# Patient Record
Sex: FEMALE | URBAN - METROPOLITAN AREA
[De-identification: names, ages, dates, MRNs, and addresses within clinical notes are randomized per-mention and may not be internally consistent; named-entity substitution may affect disease eponyms.]

---

## 2024-02-22 ENCOUNTER — TELEPHONE (OUTPATIENT)
Dept: ORTHOPEDIC SURGERY | Age: 74
End: 2024-02-22

## 2024-02-22 NOTE — TELEPHONE ENCOUNTER
Pt called to check on referral sent by Dr. Liu. Looked in her chart and did not see a referral or any recent notes from Dr. Liu. Pt told me she had sx in 12/14/23 and Dr. Liu referred her to   after the sx. Told her she would need to fax her records and Dr. Lerma would need to review them before we could schedule. Pt said she will call Dr. Liu's office and fax them over.

## 2024-02-29 ENCOUNTER — TELEPHONE (OUTPATIENT)
Dept: ORTHOPEDIC SURGERY | Age: 74
End: 2024-02-29

## 2024-02-29 NOTE — TELEPHONE ENCOUNTER
Rosanne Pt referred by Dr. Liu for Charcot's foot(Rt) . Pt recently had  an amputation of the second toe on the right foot in December 2023. I have placed her records in your box, please review and advise.

## 2024-07-09 ENCOUNTER — OFFICE VISIT (OUTPATIENT)
Dept: ORTHOPEDIC SURGERY | Age: 74
End: 2024-07-09
Payer: MEDICARE

## 2024-07-09 DIAGNOSIS — E11.610 CHARCOT FOOT DUE TO DIABETES MELLITUS (HCC): ICD-10-CM

## 2024-07-09 DIAGNOSIS — R52 PAIN: Primary | ICD-10-CM

## 2024-07-09 PROCEDURE — 3017F COLORECTAL CA SCREEN DOC REV: CPT | Performed by: ORTHOPAEDIC SURGERY

## 2024-07-09 PROCEDURE — G8421 BMI NOT CALCULATED: HCPCS | Performed by: ORTHOPAEDIC SURGERY

## 2024-07-09 PROCEDURE — 3046F HEMOGLOBIN A1C LEVEL >9.0%: CPT | Performed by: ORTHOPAEDIC SURGERY

## 2024-07-09 PROCEDURE — G8428 CUR MEDS NOT DOCUMENT: HCPCS | Performed by: ORTHOPAEDIC SURGERY

## 2024-07-09 PROCEDURE — 4004F PT TOBACCO SCREEN RCVD TLK: CPT | Performed by: ORTHOPAEDIC SURGERY

## 2024-07-09 PROCEDURE — 2022F DILAT RTA XM EVC RTNOPTHY: CPT | Performed by: ORTHOPAEDIC SURGERY

## 2024-07-09 PROCEDURE — G8400 PT W/DXA NO RESULTS DOC: HCPCS | Performed by: ORTHOPAEDIC SURGERY

## 2024-07-09 PROCEDURE — 99204 OFFICE O/P NEW MOD 45 MIN: CPT | Performed by: ORTHOPAEDIC SURGERY

## 2024-07-09 PROCEDURE — 1090F PRES/ABSN URINE INCON ASSESS: CPT | Performed by: ORTHOPAEDIC SURGERY

## 2024-07-09 PROCEDURE — 1123F ACP DISCUSS/DSCN MKR DOCD: CPT | Performed by: ORTHOPAEDIC SURGERY

## 2024-07-09 RX ORDER — ESCITALOPRAM OXALATE 10 MG/1
10 TABLET ORAL DAILY
COMMUNITY
Start: 2024-05-08

## 2024-07-09 RX ORDER — METOPROLOL SUCCINATE 200 MG/1
1 TABLET, EXTENDED RELEASE ORAL
COMMUNITY

## 2024-07-09 RX ORDER — POTASSIUM CHLORIDE 750 MG/1
10 TABLET, FILM COATED, EXTENDED RELEASE ORAL
COMMUNITY

## 2024-07-09 RX ORDER — METOPROLOL SUCCINATE 200 MG/1
200 TABLET, EXTENDED RELEASE ORAL DAILY
COMMUNITY

## 2024-07-09 RX ORDER — POTASSIUM CHLORIDE 1500 MG/1
TABLET, EXTENDED RELEASE ORAL
COMMUNITY

## 2024-07-09 RX ORDER — CELECOXIB 100 MG/1
CAPSULE ORAL
COMMUNITY
Start: 2024-06-19

## 2024-07-09 RX ORDER — METOPROLOL SUCCINATE 100 MG/1
100 TABLET, EXTENDED RELEASE ORAL DAILY
COMMUNITY

## 2024-07-09 RX ORDER — APIXABAN 5 MG/1
5 TABLET, FILM COATED ORAL 2 TIMES DAILY
COMMUNITY

## 2024-07-09 RX ORDER — SACUBITRIL AND VALSARTAN 24; 26 MG/1; MG/1
1 TABLET, FILM COATED ORAL 2 TIMES DAILY
COMMUNITY

## 2024-07-09 RX ORDER — METHIMAZOLE 10 MG/1
10 TABLET ORAL DAILY
COMMUNITY

## 2024-07-09 RX ORDER — AMOXICILLIN AND CLAVULANATE POTASSIUM 875; 125 MG/1; MG/1
TABLET, FILM COATED ORAL
COMMUNITY
Start: 2024-04-22

## 2024-07-09 NOTE — PROGRESS NOTES
damage from local anesthetic, damage to the airway or mouth structures, respiratory distress, cardiac disease exacerbation, potential arrhythmias, and even death.  The patient verbalized understanding of each of these risk  The patient was thoroughly informed regarding the Treatment Plan.  Through shared decision making the patient elected to proceed with surgery and consented to the procedure.         No past medical history on file.  No past surgical history on file.  Allergies   Allergen Reactions    Atorvastatin Myalgia and Other (See Comments)    Escitalopram Oxalate Diarrhea and Other (See Comments)         Current Outpatient Medications:     amoxicillin-clavulanate (AUGMENTIN) 875-125 MG per tablet, TAKE 1 TABLET BY MOUTH TWICE DAILY FOR 15 DAYS, Disp: , Rfl:     celecoxib (CELEBREX) 100 MG capsule, TAKE 1 CAPSULE BY MOUTH ONCE DAILY WITH FOOD, Disp: , Rfl:     escitalopram (LEXAPRO) 10 MG tablet, Take 1 tablet by mouth daily, Disp: , Rfl:     mupirocin (BACTROBAN) 2 % ointment, Apply topically daily, Disp: , Rfl:     ELIQUIS 5 MG TABS tablet, Take 1 tablet by mouth 2 times daily, Disp: , Rfl:     metFORMIN (GLUCOPHAGE) 500 MG tablet, Take 1 tablet by mouth nightly, Disp: , Rfl:     methIMAzole (TAPAZOLE) 10 MG tablet, Take 1 tablet by mouth daily, Disp: , Rfl:     metoprolol succinate (TOPROL XL) 100 MG extended release tablet, Take 1 tablet by mouth daily, Disp: , Rfl:     metoprolol succinate (TOPROL XL) 200 MG extended release tablet, Take 1 tablet by mouth daily, Disp: , Rfl:     metoprolol succinate (TOPROL XL) 200 MG extended release tablet, Take 1 tablet by mouth Every Day, Disp: , Rfl:     potassium chloride (K-TAB) 20 MEQ TBCR extended release tablet, TAKE 1 TABLET BY MOUTH ONCE DAILY FOR 30 DAYS WITH FOOD, Disp: , Rfl:     ENTRESTO 24-26 MG per tablet, Take 1 tablet by mouth 2 times daily, Disp: , Rfl:     potassium chloride (KLOR-CON) 10 MEQ extended release tablet, Take 1 tablet by mouth, Disp: ,

## 2024-07-10 ENCOUNTER — TELEPHONE (OUTPATIENT)
Dept: ORTHOPEDIC SURGERY | Age: 74
End: 2024-07-10

## 2024-07-10 DIAGNOSIS — L08.9 RIGHT FOOT INFECTION: ICD-10-CM

## 2024-07-22 ENCOUNTER — TELEPHONE (OUTPATIENT)
Dept: ORTHOPEDIC SURGERY | Age: 74
End: 2024-07-22

## 2024-08-05 ENCOUNTER — TELEPHONE (OUTPATIENT)
Dept: ORTHOPEDIC SURGERY | Age: 74
End: 2024-08-05

## 2024-08-12 ENCOUNTER — TELEPHONE (OUTPATIENT)
Dept: ORTHOPEDIC SURGERY | Age: 74
End: 2024-08-12

## 2024-08-27 ENCOUNTER — OFFICE VISIT (OUTPATIENT)
Dept: ORTHOPEDIC SURGERY | Age: 74
End: 2024-08-27

## 2024-08-27 DIAGNOSIS — E11.610 CHARCOT FOOT DUE TO DIABETES MELLITUS (HCC): Primary | ICD-10-CM

## 2024-08-27 NOTE — PROGRESS NOTES
Name: Doris Sol  YOB: 1950  Gender: female  MRN: [id number]    Summary: Charcot foot with plantar ulceration (prior 1st ray and 2nd,3rd toe amputations)  A1c: 9.0 (April 2024)  On Eliquis for atrial fibrillation    Patient to proceed with surgery. Consent to read: right transmetatarsal amputation with wound debridement and drain     Popliteal saphenous block-postoperative meds     CC: right charcot foot    HPI: Doris Sol is a 73 y.o. female who presents due to a wound on their foot.  Patient has been dealing with this medial plantar ulceration for approximately 2 years.  She has been seeing Dr. Liu with wound care.  She has a history of several years ago of a great toe with first ray amputation.  She underwent second and third toe amputations with Dr. Liu in December 2023. In April 2024, she developed a necrotizing soft tissue infection on the dorsal aspect of her right foot. This was surgically debrided and treated with IV antibiotics. This has left a wound on the dorsal aspect of her right foot.  Patient is currently still in wound care using an offloading shoe.      8/27/24-she is obtained her A1c.  It is 7.4.  Her cardiologist has cleared her off her Eliquis.  She is ready proceed with surgery    ROS/Meds/PSH/PMH/FH/SH:   Patient Denies fever/chills, headache, visual changes, chest pain, shortness of breath, and nausea/vomiting/diarrhea     Tobacco:  has no history on file for tobacco use.  Diabetes: Diabetic - Insulin dependent  No results found for: \"LABA1C\"    Physical Examination:  Gen: AAOx3 NAD    right lower:   Gastroc Contracture noted on silverskoid exam: With the hindfoot in neutral and forefoot supinated ankle dorsiflexion is diminished with knee in extension when compared to the knee at 90deg  Charcot foot deformity with prior 1st - 3rd toe amputations  Today the are hear primary to discuss their wound.   There is a wound: medial plantar wound is 1 cm x 1 cm

## 2024-08-27 NOTE — H&P (VIEW-ONLY)
Name: Doris Sol  YOB: 1950  Gender: female  MRN: [id number]    Summary: Charcot foot with plantar ulceration (prior 1st ray and 2nd,3rd toe amputations)  A1c: 9.0 (April 2024)  On Eliquis for atrial fibrillation    Patient to proceed with surgery. Consent to read: right transmetatarsal amputation with wound debridement and drain     Popliteal saphenous block-postoperative meds     CC: right charcot foot    HPI: Doris Sol is a 73 y.o. female who presents due to a wound on their foot.  Patient has been dealing with this medial plantar ulceration for approximately 2 years.  She has been seeing Dr. Liu with wound care.  She has a history of several years ago of a great toe with first ray amputation.  She underwent second and third toe amputations with Dr. Liu in December 2023. In April 2024, she developed a necrotizing soft tissue infection on the dorsal aspect of her right foot. This was surgically debrided and treated with IV antibiotics. This has left a wound on the dorsal aspect of her right foot.  Patient is currently still in wound care using an offloading shoe.      8/27/24-she is obtained her A1c.  It is 7.4.  Her cardiologist has cleared her off her Eliquis.  She is ready proceed with surgery    ROS/Meds/PSH/PMH/FH/SH:   Patient Denies fever/chills, headache, visual changes, chest pain, shortness of breath, and nausea/vomiting/diarrhea     Tobacco:  has no history on file for tobacco use.  Diabetes: Diabetic - Insulin dependent  No results found for: \"LABA1C\"    Physical Examination:  Gen: AAOx3 NAD    right lower:   Gastroc Contracture noted on silverskoid exam: With the hindfoot in neutral and forefoot supinated ankle dorsiflexion is diminished with knee in extension when compared to the knee at 90deg  Charcot foot deformity with prior 1st - 3rd toe amputations  Today the are hear primary to discuss their wound.   There is a wound: medial plantar wound is 1 cm x 1 cm  tablet by mouth, Disp: , Rfl:

## 2024-08-28 RX ORDER — FUROSEMIDE 40 MG
40 TABLET ORAL 2 TIMES DAILY
COMMUNITY

## 2024-08-28 NOTE — PERIOP NOTE
Patient verified name and .  Order for consent not found in EHR and matches case posting; patient verifies procedure.   Type 1B surgery, phone assessment complete.  Orders not received.  Labs per surgeon: none  Labs per anesthesia protocol: Hgb, POC Glucose DOS, K+  Labs s/h for DOS. Patient is unable to come in for labs PTS 24. Requests labs be drawn morning of procedure and verbalizes understanding abnormal results may result in cancellation per MD's discretion.     Cardiac clearance for surgery and clearance for medication hold  in EHR    Patient answered medical/surgical history questions at their best of ability. All prior to admission medications documented in EPIC.    Patient instructed to continue taking all prescription medications up to the day of surgery but to take only the following medications the day of surgery according to anesthesia guidelines with a small sip of water: Metoprolol succinate (Topol XL), sacubitril-valsartan (Entresto), Methlmazole (Tapazole)  Also, patient is requested to take 2 Tylenol in the morning and then again before bed on the day before surgery. Regular or extra strength may be used.       Patient informed that all vitamins and supplements should be held 7 days prior to surgery and NSAIDS 5 days prior to surgery. Prescription meds to hold:Furosemide (Lasix), Celecoxib (Celebrex)   Patient holding Apixaban (Eliquis) as instructed by Cardiology/Surgeon (2 days PTS)    Patient instructed on the following:    > Arrive at [facility]  Entrance, time of arrival to be called the day before by 1700  > NPO after midnight, unless otherwise indicated, including gum, mints, and ice chips  > Responsible adult must drive patient to the hospital, stay during surgery, and patient will need supervision 24 hours after anesthesia  > Use non moisturizing soap in shower the night before surgery and on the morning of surgery  > All piercings must be removed prior to arrival.    > Leave all  valuables (money and jewelry) at home but bring insurance card and ID on DOS.   > You may be required to pay a deductible or co-pay on the day of your procedure. You can pre-pay by calling [phone] if your surgery is at the [facility] or [phone] if your surgery is at the [facility].  > Do not wear make-up, nail polish, lotions, cologne, perfumes, powders, or oil on skin. Artificial nails are not permitted.

## 2024-08-30 DIAGNOSIS — G89.18 POST-OP PAIN: Primary | ICD-10-CM

## 2024-08-30 RX ORDER — ASPIRIN 81 MG/1
81 TABLET ORAL 2 TIMES DAILY
Qty: 40 TABLET | Refills: 0 | OUTPATIENT
Start: 2024-08-30 | End: 2024-09-19

## 2024-08-30 RX ORDER — DOCUSATE SODIUM 100 MG/1
100 CAPSULE, LIQUID FILLED ORAL 2 TIMES DAILY
Qty: 60 CAPSULE | Refills: 0 | Status: SHIPPED | OUTPATIENT
Start: 2024-08-30 | End: 2024-09-29

## 2024-08-30 RX ORDER — ONDANSETRON 4 MG/1
4 TABLET, FILM COATED ORAL DAILY PRN
Qty: 30 TABLET | Refills: 0 | Status: SHIPPED | OUTPATIENT
Start: 2024-08-30

## 2024-08-30 RX ORDER — ONDANSETRON 4 MG/1
4 TABLET, FILM COATED ORAL DAILY PRN
Qty: 30 TABLET | Refills: 0 | OUTPATIENT
Start: 2024-08-30

## 2024-08-30 RX ORDER — OXYCODONE HYDROCHLORIDE 5 MG/1
5 TABLET ORAL EVERY 6 HOURS PRN
Qty: 20 TABLET | Refills: 0 | OUTPATIENT
Start: 2024-08-30 | End: 2024-09-04

## 2024-08-30 RX ORDER — OXYCODONE HYDROCHLORIDE 5 MG/1
5 TABLET ORAL EVERY 6 HOURS PRN
Qty: 20 TABLET | Refills: 0 | Status: SHIPPED | OUTPATIENT
Start: 2024-08-30 | End: 2024-09-04

## 2024-08-30 RX ORDER — DOCUSATE SODIUM 100 MG/1
100 CAPSULE, LIQUID FILLED ORAL DAILY PRN
Qty: 30 CAPSULE | Refills: 0 | OUTPATIENT
Start: 2024-08-30

## 2024-09-03 RX ORDER — IPRATROPIUM BROMIDE AND ALBUTEROL SULFATE 2.5; .5 MG/3ML; MG/3ML
1 SOLUTION RESPIRATORY (INHALATION)
Status: CANCELLED | OUTPATIENT
Start: 2024-09-03 | End: 2024-09-04

## 2024-09-03 RX ORDER — HALOPERIDOL 5 MG/ML
1 INJECTION INTRAMUSCULAR
Status: CANCELLED | OUTPATIENT
Start: 2024-09-03 | End: 2024-09-04

## 2024-09-03 RX ORDER — PROCHLORPERAZINE EDISYLATE 5 MG/ML
5 INJECTION INTRAMUSCULAR; INTRAVENOUS
Status: CANCELLED | OUTPATIENT
Start: 2024-09-03 | End: 2024-09-04

## 2024-09-03 RX ORDER — HYDROMORPHONE HYDROCHLORIDE 2 MG/ML
0.5 INJECTION, SOLUTION INTRAMUSCULAR; INTRAVENOUS; SUBCUTANEOUS EVERY 5 MIN PRN
Status: CANCELLED | OUTPATIENT
Start: 2024-09-03

## 2024-09-03 RX ORDER — NALOXONE HYDROCHLORIDE 0.4 MG/ML
INJECTION, SOLUTION INTRAMUSCULAR; INTRAVENOUS; SUBCUTANEOUS PRN
Status: CANCELLED | OUTPATIENT
Start: 2024-09-03

## 2024-09-03 RX ORDER — OXYCODONE HYDROCHLORIDE 5 MG/1
5 TABLET ORAL
Status: CANCELLED | OUTPATIENT
Start: 2024-09-03 | End: 2024-09-04

## 2024-09-03 NOTE — PROGRESS NOTES
Preop department called to notify patient of arrival time for scheduled procedure. Instructions given to   - Arrive at OPC Entrance [address].  - Remain NPO after midnight, unless otherwise indicated, including gum, mints, and ice chips.   - Have a responsible adult to drive patient to the hospital, stay during surgery, and patient will need supervision 24 hours after anesthesia.   - Use antibacterial soap in shower the night before surgery and on the morning of surgery.       Was patient contacted: yes, self  Voicemail left: n/a  Numbers contacted: [phone]   Arrival time: 0630

## 2024-09-04 ENCOUNTER — ANESTHESIA EVENT (OUTPATIENT)
Dept: SURGERY | Age: 74
End: 2024-09-04
Payer: MEDICARE

## 2024-09-04 ENCOUNTER — ANESTHESIA (OUTPATIENT)
Dept: SURGERY | Age: 74
End: 2024-09-04
Payer: MEDICARE

## 2024-09-04 ENCOUNTER — APPOINTMENT (OUTPATIENT)
Dept: GENERAL RADIOLOGY | Age: 74
End: 2024-09-04
Attending: ORTHOPAEDIC SURGERY
Payer: MEDICARE

## 2024-09-04 ENCOUNTER — HOSPITAL ENCOUNTER (OUTPATIENT)
Age: 74
Setting detail: OBSERVATION
LOS: 1 days | Discharge: NURSING, REHAB OR LONG-TERM CARE | End: 2024-09-06
Attending: ORTHOPAEDIC SURGERY | Admitting: ORTHOPAEDIC SURGERY
Payer: MEDICARE

## 2024-09-04 DIAGNOSIS — G89.18 POST-OP PAIN: ICD-10-CM

## 2024-09-04 PROBLEM — E11.628 DIABETIC INFECTION OF RIGHT FOOT (HCC): Status: ACTIVE | Noted: 2024-09-04

## 2024-09-04 PROBLEM — E78.5 HLD (HYPERLIPIDEMIA): Status: ACTIVE | Noted: 2024-09-04

## 2024-09-04 PROBLEM — Z98.890 STATUS POST FOOT SURGERY: Status: ACTIVE | Noted: 2024-09-04

## 2024-09-04 PROBLEM — I48.91 A-FIB (HCC): Status: ACTIVE | Noted: 2024-09-04

## 2024-09-04 PROBLEM — L08.9 DIABETIC INFECTION OF RIGHT FOOT (HCC): Status: ACTIVE | Noted: 2024-09-04

## 2024-09-04 PROBLEM — E11.9 DIABETES MELLITUS TYPE 2 IN NONOBESE (HCC): Status: ACTIVE | Noted: 2024-09-04

## 2024-09-04 PROBLEM — I10 HTN (HYPERTENSION): Status: ACTIVE | Noted: 2024-09-04

## 2024-09-04 LAB
CREAT SERPL-MCNC: 1.01 MG/DL (ref 0.6–1.1)
GLUCOSE BLD STRIP.AUTO-MCNC: 112 MG/DL (ref 65–100)
GLUCOSE BLD STRIP.AUTO-MCNC: 177 MG/DL (ref 65–100)
GLUCOSE BLD STRIP.AUTO-MCNC: 258 MG/DL (ref 65–100)
GLUCOSE BLD STRIP.AUTO-MCNC: 338 MG/DL (ref 65–100)
HGB BLD-MCNC: 10.3 G/DL (ref 11.7–15.4)
PLATELET # BLD AUTO: 231 K/UL (ref 150–450)
POTASSIUM BLD-SCNC: 4.4 MMOL/L (ref 3.5–5.1)
SERVICE CMNT-IMP: ABNORMAL

## 2024-09-04 PROCEDURE — 3600000013 HC SURGERY LEVEL 3 ADDTL 15MIN: Performed by: ORTHOPAEDIC SURGERY

## 2024-09-04 PROCEDURE — 7100000000 HC PACU RECOVERY - FIRST 15 MIN: Performed by: ORTHOPAEDIC SURGERY

## 2024-09-04 PROCEDURE — 7100000001 HC PACU RECOVERY - ADDTL 15 MIN: Performed by: ORTHOPAEDIC SURGERY

## 2024-09-04 PROCEDURE — 3700000001 HC ADD 15 MINUTES (ANESTHESIA): Performed by: ORTHOPAEDIC SURGERY

## 2024-09-04 PROCEDURE — 2580000003 HC RX 258: Performed by: ORTHOPAEDIC SURGERY

## 2024-09-04 PROCEDURE — 2580000003 HC RX 258: Performed by: REGISTERED NURSE

## 2024-09-04 PROCEDURE — 82962 GLUCOSE BLOOD TEST: CPT

## 2024-09-04 PROCEDURE — 97535 SELF CARE MNGMENT TRAINING: CPT

## 2024-09-04 PROCEDURE — 6370000000 HC RX 637 (ALT 250 FOR IP): Performed by: ANESTHESIOLOGY

## 2024-09-04 PROCEDURE — G0378 HOSPITAL OBSERVATION PER HR: HCPCS

## 2024-09-04 PROCEDURE — 97165 OT EVAL LOW COMPLEX 30 MIN: CPT

## 2024-09-04 PROCEDURE — 82565 ASSAY OF CREATININE: CPT

## 2024-09-04 PROCEDURE — 2500000003 HC RX 250 WO HCPCS: Performed by: ORTHOPAEDIC SURGERY

## 2024-09-04 PROCEDURE — 85049 AUTOMATED PLATELET COUNT: CPT

## 2024-09-04 PROCEDURE — 2709999900 HC NON-CHARGEABLE SUPPLY: Performed by: ORTHOPAEDIC SURGERY

## 2024-09-04 PROCEDURE — 6370000000 HC RX 637 (ALT 250 FOR IP): Performed by: ORTHOPAEDIC SURGERY

## 2024-09-04 PROCEDURE — 64447 NJX AA&/STRD FEMORAL NRV IMG: CPT | Performed by: ANESTHESIOLOGY

## 2024-09-04 PROCEDURE — 2500000003 HC RX 250 WO HCPCS: Performed by: REGISTERED NURSE

## 2024-09-04 PROCEDURE — 6370000000 HC RX 637 (ALT 250 FOR IP): Performed by: INTERNAL MEDICINE

## 2024-09-04 PROCEDURE — 11042 DBRDMT SUBQ TIS 1ST 20SQCM/<: CPT | Performed by: ORTHOPAEDIC SURGERY

## 2024-09-04 PROCEDURE — 36415 COLL VENOUS BLD VENIPUNCTURE: CPT

## 2024-09-04 PROCEDURE — 3600000003 HC SURGERY LEVEL 3 BASE: Performed by: ORTHOPAEDIC SURGERY

## 2024-09-04 PROCEDURE — 27685 REVISION OF LOWER LEG TENDON: CPT | Performed by: ORTHOPAEDIC SURGERY

## 2024-09-04 PROCEDURE — 85018 HEMOGLOBIN: CPT

## 2024-09-04 PROCEDURE — 6360000002 HC RX W HCPCS: Performed by: PHYSICIAN ASSISTANT

## 2024-09-04 PROCEDURE — 28800 AMPUTATION OF MIDFOOT: CPT | Performed by: ORTHOPAEDIC SURGERY

## 2024-09-04 PROCEDURE — 3700000000 HC ANESTHESIA ATTENDED CARE: Performed by: ORTHOPAEDIC SURGERY

## 2024-09-04 PROCEDURE — 6360000002 HC RX W HCPCS: Performed by: ANESTHESIOLOGY

## 2024-09-04 PROCEDURE — 6360000002 HC RX W HCPCS: Performed by: ORTHOPAEDIC SURGERY

## 2024-09-04 PROCEDURE — 97530 THERAPEUTIC ACTIVITIES: CPT

## 2024-09-04 PROCEDURE — 2580000003 HC RX 258: Performed by: ANESTHESIOLOGY

## 2024-09-04 PROCEDURE — 64445 NJX AA&/STRD SCIATIC NRV IMG: CPT | Performed by: ANESTHESIOLOGY

## 2024-09-04 PROCEDURE — 6360000002 HC RX W HCPCS: Performed by: REGISTERED NURSE

## 2024-09-04 PROCEDURE — 84132 ASSAY OF SERUM POTASSIUM: CPT

## 2024-09-04 RX ORDER — SODIUM CHLORIDE 0.9 % (FLUSH) 0.9 %
5-40 SYRINGE (ML) INJECTION PRN
Status: DISCONTINUED | OUTPATIENT
Start: 2024-09-04 | End: 2024-09-06 | Stop reason: HOSPADM

## 2024-09-04 RX ORDER — SODIUM CHLORIDE 9 MG/ML
INJECTION, SOLUTION INTRAVENOUS PRN
Status: DISCONTINUED | OUTPATIENT
Start: 2024-09-04 | End: 2024-09-04 | Stop reason: HOSPADM

## 2024-09-04 RX ORDER — OXYCODONE HYDROCHLORIDE 5 MG/1
5 TABLET ORAL EVERY 4 HOURS PRN
Status: DISCONTINUED | OUTPATIENT
Start: 2024-09-04 | End: 2024-09-06 | Stop reason: HOSPADM

## 2024-09-04 RX ORDER — ONDANSETRON 4 MG/1
4 TABLET, ORALLY DISINTEGRATING ORAL EVERY 8 HOURS PRN
Status: DISCONTINUED | OUTPATIENT
Start: 2024-09-04 | End: 2024-09-06 | Stop reason: HOSPADM

## 2024-09-04 RX ORDER — POLYETHYLENE GLYCOL 3350 17 G/17G
17 POWDER, FOR SOLUTION ORAL DAILY
Status: DISCONTINUED | OUTPATIENT
Start: 2024-09-04 | End: 2024-09-06 | Stop reason: HOSPADM

## 2024-09-04 RX ORDER — POTASSIUM CHLORIDE 29.8 MG/ML
20 INJECTION INTRAVENOUS PRN
Status: DISCONTINUED | OUTPATIENT
Start: 2024-09-04 | End: 2024-09-06 | Stop reason: HOSPADM

## 2024-09-04 RX ORDER — SODIUM CHLORIDE, SODIUM LACTATE, POTASSIUM CHLORIDE, CALCIUM CHLORIDE 600; 310; 30; 20 MG/100ML; MG/100ML; MG/100ML; MG/100ML
INJECTION, SOLUTION INTRAVENOUS CONTINUOUS
Status: DISCONTINUED | OUTPATIENT
Start: 2024-09-04 | End: 2024-09-04 | Stop reason: HOSPADM

## 2024-09-04 RX ORDER — INSULIN LISPRO 100 [IU]/ML
0-4 INJECTION, SOLUTION INTRAVENOUS; SUBCUTANEOUS
Status: DISCONTINUED | OUTPATIENT
Start: 2024-09-04 | End: 2024-09-06 | Stop reason: HOSPADM

## 2024-09-04 RX ORDER — MIDAZOLAM HYDROCHLORIDE 2 MG/2ML
2 INJECTION, SOLUTION INTRAMUSCULAR; INTRAVENOUS
Status: COMPLETED | OUTPATIENT
Start: 2024-09-04 | End: 2024-09-04

## 2024-09-04 RX ORDER — MAGNESIUM SULFATE IN WATER 40 MG/ML
2000 INJECTION, SOLUTION INTRAVENOUS PRN
Status: DISCONTINUED | OUTPATIENT
Start: 2024-09-04 | End: 2024-09-06 | Stop reason: HOSPADM

## 2024-09-04 RX ORDER — FENTANYL CITRATE 50 UG/ML
100 INJECTION, SOLUTION INTRAMUSCULAR; INTRAVENOUS
Status: COMPLETED | OUTPATIENT
Start: 2024-09-04 | End: 2024-09-04

## 2024-09-04 RX ORDER — LIDOCAINE HYDROCHLORIDE 20 MG/ML
INJECTION, SOLUTION EPIDURAL; INFILTRATION; INTRACAUDAL; PERINEURAL PRN
Status: DISCONTINUED | OUTPATIENT
Start: 2024-09-04 | End: 2024-09-04 | Stop reason: SDUPTHER

## 2024-09-04 RX ORDER — DEXTROSE MONOHYDRATE 100 MG/ML
INJECTION, SOLUTION INTRAVENOUS CONTINUOUS PRN
Status: DISCONTINUED | OUTPATIENT
Start: 2024-09-04 | End: 2024-09-06 | Stop reason: HOSPADM

## 2024-09-04 RX ORDER — EPHEDRINE SULFATE 5 MG/ML
INJECTION INTRAVENOUS PRN
Status: DISCONTINUED | OUTPATIENT
Start: 2024-09-04 | End: 2024-09-04 | Stop reason: SDUPTHER

## 2024-09-04 RX ORDER — ACETAMINOPHEN 325 MG/1
650 TABLET ORAL EVERY 6 HOURS
Status: DISCONTINUED | OUTPATIENT
Start: 2024-09-04 | End: 2024-09-06 | Stop reason: HOSPADM

## 2024-09-04 RX ORDER — MORPHINE SULFATE 4 MG/ML
4 INJECTION, SOLUTION INTRAMUSCULAR; INTRAVENOUS
Status: DISCONTINUED | OUTPATIENT
Start: 2024-09-04 | End: 2024-09-06 | Stop reason: HOSPADM

## 2024-09-04 RX ORDER — METHIMAZOLE 10 MG/1
10 TABLET ORAL DAILY
Status: DISCONTINUED | OUTPATIENT
Start: 2024-09-05 | End: 2024-09-06 | Stop reason: HOSPADM

## 2024-09-04 RX ORDER — ONDANSETRON 4 MG/1
4 TABLET, FILM COATED ORAL DAILY PRN
Status: DISCONTINUED | OUTPATIENT
Start: 2024-09-04 | End: 2024-09-04 | Stop reason: SDUPTHER

## 2024-09-04 RX ORDER — POTASSIUM CHLORIDE 7.45 MG/ML
10 INJECTION INTRAVENOUS PRN
Status: DISCONTINUED | OUTPATIENT
Start: 2024-09-04 | End: 2024-09-06 | Stop reason: HOSPADM

## 2024-09-04 RX ORDER — MORPHINE SULFATE 2 MG/ML
2 INJECTION, SOLUTION INTRAMUSCULAR; INTRAVENOUS
Status: DISCONTINUED | OUTPATIENT
Start: 2024-09-04 | End: 2024-09-06 | Stop reason: HOSPADM

## 2024-09-04 RX ORDER — OXYCODONE HYDROCHLORIDE 5 MG/1
10 TABLET ORAL EVERY 4 HOURS PRN
Status: DISCONTINUED | OUTPATIENT
Start: 2024-09-04 | End: 2024-09-06 | Stop reason: HOSPADM

## 2024-09-04 RX ORDER — ACETAMINOPHEN 500 MG
1000 TABLET ORAL ONCE
Status: COMPLETED | OUTPATIENT
Start: 2024-09-04 | End: 2024-09-04

## 2024-09-04 RX ORDER — DEXAMETHASONE SODIUM PHOSPHATE 10 MG/ML
INJECTION, SOLUTION INTRAMUSCULAR; INTRAVENOUS
Status: COMPLETED | OUTPATIENT
Start: 2024-09-04 | End: 2024-09-04

## 2024-09-04 RX ORDER — ONDANSETRON 2 MG/ML
4 INJECTION INTRAMUSCULAR; INTRAVENOUS EVERY 6 HOURS PRN
Status: DISCONTINUED | OUTPATIENT
Start: 2024-09-04 | End: 2024-09-06 | Stop reason: HOSPADM

## 2024-09-04 RX ORDER — SODIUM CHLORIDE 0.9 % (FLUSH) 0.9 %
5-40 SYRINGE (ML) INJECTION EVERY 12 HOURS SCHEDULED
Status: DISCONTINUED | OUTPATIENT
Start: 2024-09-04 | End: 2024-09-04 | Stop reason: HOSPADM

## 2024-09-04 RX ORDER — PROPOFOL 10 MG/ML
INJECTION, EMULSION INTRAVENOUS PRN
Status: DISCONTINUED | OUTPATIENT
Start: 2024-09-04 | End: 2024-09-04 | Stop reason: SDUPTHER

## 2024-09-04 RX ORDER — TRANEXAMIC ACID 100 MG/ML
INJECTION, SOLUTION INTRAVENOUS PRN
Status: DISCONTINUED | OUTPATIENT
Start: 2024-09-04 | End: 2024-09-04 | Stop reason: ALTCHOICE

## 2024-09-04 RX ORDER — LIDOCAINE HYDROCHLORIDE 10 MG/ML
1 INJECTION, SOLUTION INFILTRATION; PERINEURAL
Status: DISCONTINUED | OUTPATIENT
Start: 2024-09-04 | End: 2024-09-04 | Stop reason: HOSPADM

## 2024-09-04 RX ORDER — FUROSEMIDE 40 MG
40 TABLET ORAL EVERY MORNING
Status: DISCONTINUED | OUTPATIENT
Start: 2024-09-05 | End: 2024-09-06 | Stop reason: HOSPADM

## 2024-09-04 RX ORDER — SODIUM CHLORIDE 9 MG/ML
INJECTION, SOLUTION INTRAVENOUS PRN
Status: DISCONTINUED | OUTPATIENT
Start: 2024-09-04 | End: 2024-09-06 | Stop reason: HOSPADM

## 2024-09-04 RX ORDER — INSULIN LISPRO 100 [IU]/ML
0-4 INJECTION, SOLUTION INTRAVENOUS; SUBCUTANEOUS NIGHTLY
Status: DISCONTINUED | OUTPATIENT
Start: 2024-09-04 | End: 2024-09-06 | Stop reason: HOSPADM

## 2024-09-04 RX ORDER — DOCUSATE SODIUM 100 MG/1
100 CAPSULE, LIQUID FILLED ORAL 2 TIMES DAILY
Status: DISCONTINUED | OUTPATIENT
Start: 2024-09-04 | End: 2024-09-06 | Stop reason: HOSPADM

## 2024-09-04 RX ORDER — SODIUM CHLORIDE 0.9 % (FLUSH) 0.9 %
5-40 SYRINGE (ML) INJECTION EVERY 12 HOURS SCHEDULED
Status: DISCONTINUED | OUTPATIENT
Start: 2024-09-04 | End: 2024-09-06 | Stop reason: HOSPADM

## 2024-09-04 RX ORDER — IBUPROFEN 600 MG/1
1 TABLET ORAL PRN
Status: DISCONTINUED | OUTPATIENT
Start: 2024-09-04 | End: 2024-09-06 | Stop reason: HOSPADM

## 2024-09-04 RX ORDER — SODIUM CHLORIDE 0.9 % (FLUSH) 0.9 %
5-40 SYRINGE (ML) INJECTION PRN
Status: DISCONTINUED | OUTPATIENT
Start: 2024-09-04 | End: 2024-09-04 | Stop reason: HOSPADM

## 2024-09-04 RX ORDER — METOPROLOL SUCCINATE 50 MG/1
200 TABLET, EXTENDED RELEASE ORAL DAILY
Status: DISCONTINUED | OUTPATIENT
Start: 2024-09-05 | End: 2024-09-06 | Stop reason: HOSPADM

## 2024-09-04 RX ORDER — ESCITALOPRAM OXALATE 10 MG/1
10 TABLET ORAL DAILY
Status: DISCONTINUED | OUTPATIENT
Start: 2024-09-04 | End: 2024-09-04

## 2024-09-04 RX ADMIN — ACETAMINOPHEN 650 MG: 325 TABLET ORAL at 12:50

## 2024-09-04 RX ADMIN — PHENYLEPHRINE HYDROCHLORIDE 100 MCG: 10 INJECTION INTRAVENOUS at 09:26

## 2024-09-04 RX ADMIN — INSULIN LISPRO 2 UNITS: 100 INJECTION, SOLUTION INTRAVENOUS; SUBCUTANEOUS at 17:19

## 2024-09-04 RX ADMIN — MIDAZOLAM 1 MG: 1 INJECTION INTRAMUSCULAR; INTRAVENOUS at 07:59

## 2024-09-04 RX ADMIN — DEXAMETHASONE SODIUM PHOSPHATE 2 MG: 10 INJECTION, SOLUTION INTRAMUSCULAR; INTRAVENOUS at 08:04

## 2024-09-04 RX ADMIN — INSULIN LISPRO 4 UNITS: 100 INJECTION, SOLUTION INTRAVENOUS; SUBCUTANEOUS at 20:36

## 2024-09-04 RX ADMIN — EPINEPHRINE 10 ML: 1 INJECTION, SOLUTION, CONCENTRATE INTRAVENOUS at 07:59

## 2024-09-04 RX ADMIN — Medication 2000 MG: at 08:59

## 2024-09-04 RX ADMIN — ACETAMINOPHEN 1000 MG: 500 TABLET, FILM COATED ORAL at 07:31

## 2024-09-04 RX ADMIN — APIXABAN 5 MG: 5 TABLET, FILM COATED ORAL at 15:07

## 2024-09-04 RX ADMIN — EPHEDRINE SULFATE 5 MG: 5 INJECTION INTRAVENOUS at 09:00

## 2024-09-04 RX ADMIN — ROPIVACAINE HYDROCHLORIDE 20 ML: 5 INJECTION, SOLUTION EPIDURAL; INFILTRATION; PERINEURAL at 07:59

## 2024-09-04 RX ADMIN — CEFAZOLIN 2000 MG: 10 INJECTION, POWDER, FOR SOLUTION INTRAVENOUS at 17:19

## 2024-09-04 RX ADMIN — FENTANYL CITRATE 50 MCG: 50 INJECTION INTRAMUSCULAR; INTRAVENOUS at 07:59

## 2024-09-04 RX ADMIN — DOCUSATE SODIUM 100 MG: 100 CAPSULE, LIQUID FILLED ORAL at 12:50

## 2024-09-04 RX ADMIN — ACETAMINOPHEN 650 MG: 325 TABLET ORAL at 18:02

## 2024-09-04 RX ADMIN — EPINEPHRINE 5 ML: 1 INJECTION, SOLUTION, CONCENTRATE INTRAVENOUS at 08:04

## 2024-09-04 RX ADMIN — DOCUSATE SODIUM 100 MG: 100 CAPSULE, LIQUID FILLED ORAL at 20:36

## 2024-09-04 RX ADMIN — EPHEDRINE SULFATE 10 MG: 5 INJECTION INTRAVENOUS at 09:30

## 2024-09-04 RX ADMIN — PROPOFOL 140 MCG/KG/MIN: 10 INJECTION, EMULSION INTRAVENOUS at 09:04

## 2024-09-04 RX ADMIN — PHENYLEPHRINE HYDROCHLORIDE 100 MCG: 10 INJECTION INTRAVENOUS at 09:42

## 2024-09-04 RX ADMIN — LIDOCAINE HYDROCHLORIDE 40 MG: 20 INJECTION, SOLUTION EPIDURAL; INFILTRATION; INTRACAUDAL; PERINEURAL at 09:03

## 2024-09-04 RX ADMIN — ROPIVACAINE HYDROCHLORIDE 10 ML: 5 INJECTION, SOLUTION EPIDURAL; INFILTRATION; PERINEURAL at 08:04

## 2024-09-04 RX ADMIN — DEXAMETHASONE SODIUM PHOSPHATE 4 MG: 10 INJECTION, SOLUTION INTRAMUSCULAR; INTRAVENOUS at 07:59

## 2024-09-04 RX ADMIN — SODIUM CHLORIDE, POTASSIUM CHLORIDE, SODIUM LACTATE AND CALCIUM CHLORIDE: 600; 310; 30; 20 INJECTION, SOLUTION INTRAVENOUS at 07:31

## 2024-09-04 RX ADMIN — SODIUM CHLORIDE, PRESERVATIVE FREE 10 ML: 5 INJECTION INTRAVENOUS at 20:37

## 2024-09-04 RX ADMIN — SACUBITRIL AND VALSARTAN 0.5 TABLET: 24; 26 TABLET, FILM COATED ORAL at 20:36

## 2024-09-04 RX ADMIN — PHENYLEPHRINE HYDROCHLORIDE 100 MCG: 10 INJECTION INTRAVENOUS at 09:53

## 2024-09-04 RX ADMIN — EPHEDRINE SULFATE 10 MG: 5 INJECTION INTRAVENOUS at 09:49

## 2024-09-04 RX ADMIN — PROPOFOL 30 MG: 10 INJECTION, EMULSION INTRAVENOUS at 09:03

## 2024-09-04 ASSESSMENT — PAIN SCALES - GENERAL
PAINLEVEL_OUTOF10: 0
PAINLEVEL_OUTOF10: 0

## 2024-09-04 NOTE — CONSULTS
[facility] Hospitalist Consult   Admit Date:  2024  6:31 AM   Name:  Doris Sol   Age:  73 y.o.  Sex:  female  :  1950   MRN:  [id number]   Room:  [facility]    Presenting/Chief Complaint: No chief complaint on file.    Reason(s) for Admission: Right foot infection [L08.9]  Status post foot surgery [Z98.890]  Diabetic infection of right foot (HCC) [E11.628, L08.9]     Hospitalists consulted by Matthieu Lerma MD for: Medical management    History of Presenting Illness:   Doris Sol is a 73 y.o. female with history of type 2 diabetes mellitus, hypertension, atrial fibrillation on Eliquis, and diastolic heart failure who presented with.  Patient presented secondary to right foot wound and admitted by orthopedic surgery team.  Patient underwent surgical intervention with orthopedic surgery team and we were consulted for medical management.      Assessment & Plan:     Right foot infection  Patient went for surgical intervention with orthopedic surgery today  Currently on cefazolin  Management per orthopedic surgery    Hypertension  Blood pressure adequately controlled at this time  Continue home blood pressure medication  Continue to monitor and adjust as needed    Type 2 diabetes mellitus  Correctional scale insulin  Continue to monitor and adjust as needed    Atrial fibrillation  Chronic diastolic heart failure  Patient appears euvolemic on exam  Heart rate adequately controlled  Lasix 40 mg twice daily  Metoprolol 200 mg daily  Continue Eliquis 5 mg twice daily  Strict I's/O and daily weights  Continue to monitor      Anticipated Discharge Arrangements:   Defer to primary team    PT/OT evals ordered?  Defer to primary team  Diet:  ADULT DIET; Regular  VTE prophylaxis: Already on anticoagulation  Code status: Full Code    Non-peripheral Lines and Tubes (if present):          Telemetry (if present):           Hospital Problems:  Principal Problem:    Right foot infection  Active  sounds: Normal breath sounds. No wheezing or rales.   Abdominal:      General: There is no distension.      Palpations: Abdomen is soft.      Tenderness: There is no abdominal tenderness.   Musculoskeletal:      Comments: Right leg with soft cast in place   Skin:     General: Skin is warm.   Neurological:      Mental Status: She is alert. Mental status is at baseline.           I have personally reviewed labs and tests showing:  Recent Labs:  Recent Results (from the past 24 hour(s))   Hemoglobin    Collection Time: 09/04/24  7:25 AM   Result Value Ref Range    Hemoglobin 10.3 (L) 11.7 - 15.4 g/dL   POCT Glucose    Collection Time: 09/04/24  7:26 AM   Result Value Ref Range    POC Glucose 112 (H) 65 - 100 mg/dL    Performed by: Cameron    POC Sodium and Potassium    Collection Time: 09/04/24  7:27 AM   Result Value Ref Range    POC Potassium 4.4 3.5 - 5.1 MMOL/L   POCT Glucose    Collection Time: 09/04/24 12:14 PM   Result Value Ref Range    POC Glucose 177 (H) 65 - 100 mg/dL    Performed by: Leah        No results for input(s): \"COVID19\" in the last 72 hours.    I have personally reviewed imaging studies showing:  NC XR TECHNOLOGIST SERVICE    Result Date: 9/4/2024  Radiology exam is complete. No Radiologist dictation. Please follow up with ordering provider.         Echocardiogram:  No results found for this or any previous visit.      Current Facility-Administered Medications   Medication Dose Route Frequency    docusate sodium (COLACE) capsule 100 mg  100 mg Oral BID    apixaban (ELIQUIS) tablet 5 mg  5 mg Oral BID    furosemide (LASIX) tablet 40 mg  40 mg Oral BID    [START ON 9/5/2024] methIMAzole (TAPAZOLE) tablet 10 mg  10 mg Oral Daily    [START ON 9/5/2024] metoprolol succinate (TOPROL XL) extended release tablet 200 mg  200 mg Oral Daily    sodium chloride flush 0.9 % injection 5-40 mL  5-40 mL IntraVENous 2 times per day    sodium chloride flush 0.9 % injection 5-40 mL  5-40 mL

## 2024-09-04 NOTE — PROGRESS NOTES
CH responded to a consult request for spiritual support.  has made initial visit, reviewed the chart, consulted with the nurse and completed spiritual assessment. Pt's daughter was present during the visit. Pt is from [employer] [place] background, [place]. Pt was working with the therapist, Ch talked to the patient. Pt shared about his health situation. Ch offered words of comfort and prayed for the pt. Ch provided spiritual care and emotional support through pastoral presence, non-anxious presence, active listening, and prayer. CH is available as needed.

## 2024-09-04 NOTE — PROGRESS NOTES
4 Eyes Skin Assessment     NAME:  Doris Sol  YOB: 1950  MEDICAL RECORD NUMBER:  [id number]    The patient is being assessed for  Admission    I agree that at least one RN has performed a thorough Head to Toe Skin Assessment on the patient. ALL assessment sites listed below have been assessed.      Areas assessed by both nurses:    Head, Face, Ears, Shoulders, Back, Chest, Arms, Elbows, Hands, Sacrum. Buttock, Coccyx, Ischium, Legs. Feet and Heels, and Under Medical Devices         Does the Patient have a Wound? Yes wound(s) were present on assessment. LDA wound assessment was Initiated and completed by RN, R foot amputation       Stevo Prevention initiated by RN: Yes  Wound Care Orders initiated by RN: No    Pressure Injury (Stage 3,4, Unstageable, DTI, NWPT, and Complex wounds) if present, place Wound referral order by RN under : No    New Ostomies, if present place, Ostomy referral order under : No     Nurse 1 eSignature: Electronically signed by PHONG FARIAS RN on 9/4/24 at 12:25 PM EDT    **SHARE this note so that the co-signing nurse can place an eSignature**    Nurse 2 eSignature: Electronically signed by Awilda Osborn RN on 9/4/24 at 12:46 PM EDT

## 2024-09-04 NOTE — PROGRESS NOTES
Spiritual Consult for Pre-Surgery Prayer. PT was in bed preparing for surgery. PT shared about surgery including concerns and hopes. CH offered empathetic spiritual presence, active listening, and therapeutic communication. PT expressed affection for three grown children. PT expressed trust in Eric and comfort in prayer. PT is Zoroastrian. CH offered prayer. CH offered additional support if needed.    . May Salcedo M.Div.

## 2024-09-04 NOTE — PROGRESS NOTES
Pt is in bed without complaints. Hourly rounds completed and all needs met. Pt not complaining of any pain, and has been up to the bathroom multiple times with assistance of walker. Bed is low, locked, and call light is in reach. Pt encouraged to call for assistance.

## 2024-09-04 NOTE — OP NOTE
Operative Note    Patient:Doris Sol  MRN: [id number]    Date Of Surgery: 9/4/2024    Surgeon: Matthieu Lerma MD    Assistant Surgeon: None    Procedure Performed:   right  Transmetatarsal amputation  Achilles tendon lengthening  Plantar wound debridement    Pre Op Diagnosis:  right  Foot wound with infection-diabetic foot wound  Ankle equinus    Post Op Diagnosis:   same    Implants:   * No implants in log *    Anesthesia:  Regional w/ popliteal saphenous block    Blood Loss:  50 cc    Tourniquet:  Estimated calf tourniquet at 250 mmHg for 20 minutes    Complications:  -    Pre Operative Abx:   Ancef 2g    Specimens/Cultures:  none    Significant Findings:  1-plantar ulceration underneath the first ray measuring 1 cm x 1 smear that extended down the fascia.     Pre Operative Course:  Doris Sol is a 73 y.o. female who has a history of multiple diabetic foot infections and partial amputation sites with a wound that tracks down to bone.  A discussion with her decision was made to complete this amputation with a transmetatarsal amputation, wound debridement and Achilles tendon lengthening     Operation In Detail:  Patient was evaluated in the preoperative area.  The lower extremity was marked by me.  We had a long discussion about the procedure and postoperative protocols.  The patient was then brought back to the operating room suite and placed in the operating room table.  A timeout was taken to identify the patient, procedure being performed, and laterality.  After this the patient was prepped and draped in the normal sterile fashion using a Betadine solution and/or a ChloraPrep solution.  A timeout was then taken to identify the patient his name, date of birth, laterality, and procedure being performed.  We also identified allergies and any concerns about the operation.  Attention was then placed to the operative extremity.    Antibiotics were administered as described above.      I then performed a  There is no signs of excessive bleeding.  We then cleaned out the wound with a wet and dry sponge.  We then performed counts.  All final counts were correct    Attention was then placed to the plantar ulceration.  Due to the location this ulceration a separate debridement was performed.  Using a 15 blade knife I performed an excisional debridement of skin, subcutaneous tissue, down to the fascia but not beneath the fascia..  The wound measured a total of 1 cm x 1 cm x 0.5 cm.  I then irrigated and cleaned the wound.    A sterile well-padded dressing was then applied to the leg.  The posterior slab splint wrapping anteriorly over the wound was then applied.    Post Operative Plan:   1- WB status: Non-Weight Bearing   2-plan for suture removal in 3 weeks  3-patient will be admitted postoperatively for pain control and rehab needs.    4- DVT px: asa 81mg  5- Pain Medication: oxycodone and morphine  6- PT/OT ordered to evaluate and treat    Discharge planning: Skilled nursing facility/rehab

## 2024-09-04 NOTE — INTERVAL H&P NOTE
Update History & Physical    The Patient's History and Physical was reviewed   I discussed the surgery and patients medical condition with the patient.  The chart was reviewed with the patient and I examined the patient.    There was no change.  The surgical site was confirmed by the patient and me.    CV: RRR  RESP: CTAB    Plan:  The risk, benefits, expected outcome, and alternative to the recommended procedure have been discussed with the patient.  Patient understands and wants to proceed with the procedure.    Electronically signed by Matthieu Lerma MD on 09/04/24 6:44 AM

## 2024-09-04 NOTE — ANESTHESIA PROCEDURE NOTES
Peripheral Block    Patient location during procedure: pre-op  Reason for block: post-op pain management and at surgeon's request  Start time: 9/4/2024 7:59 AM  End time: 9/4/2024 8:03 AM  Staffing  Performed: anesthesiologist   Anesthesiologist: Gustavo Biswas MD  Performed by: Gustavo Biswas MD  Authorized by: Gustavo Biswas MD    Preanesthetic Checklist  Completed: patient identified, IV checked, site marked, risks and benefits discussed, surgical/procedural consents, equipment checked, pre-op evaluation, timeout performed, anesthesia consent given, oxygen available and monitors applied/VS acknowledged  Peripheral Block   Patient position: left lateral decubitus  Prep: ChloraPrep  Provider prep: mask  Patient monitoring: cardiac monitor, continuous pulse ox, frequent blood pressure checks, IV access and oxygen  Block type: Sciatic  Popliteal  Laterality: right  Injection technique: single-shot  Guidance: ultrasound guided    Needle   Needle type: insulated echogenic nerve stimulator needle   Needle gauge: 21 G  Needle localization: ultrasound guidance  Needle length: 10 cm  Assessment   Injection assessment: negative aspiration for heme, no paresthesia on injection, local visualized surrounding nerve on ultrasound and no intravascular symptoms  Paresthesia pain: none  Slow fractionated injection: yes  Hemodynamics: stable  Outcomes: uncomplicated and patient tolerated procedure well    Additional Notes  -Block placed for post op pain at surgeon's request.     -Ultrasound used to identify anatomy of nerve bundle.    -Needle placement and local injection at perineural area confirmed with real time ultrasound guidance.     -Local visualized with ultrasound surrounding nerve.    -Permanent Image taken and placed on chart.      Medications Administered  dexAMETHasone (DECADRON) (PF) 10 mg/mL injection - Other   4 mg - 9/4/2024 7:59:00 AM  mepivacaine 1.5% with EPINEPHrine 1:200,000 injection (ANESTHESIA USE ONLY)

## 2024-09-04 NOTE — ANESTHESIA POSTPROCEDURE EVALUATION
Department of Anesthesiology  Postprocedure Note    Patient: Doris Sol  MRN: [id number]  YOB: 1950  Date of evaluation: 9/4/2024    Procedure Summary       Date: 09/04/24 Room / Location: [facility] OP OR 01 / [facility]    Anesthesia Start: 0853 Anesthesia Stop: 1021    Procedure: RIGHT TRANSMETATARSAL AMPUATION AND ACHILLES LENGTHENING (Right: Foot) Diagnosis:       Right foot infection      (Right foot infection [L08.9])    Surgeons: Matthieu Lerma MD Responsible Provider: Gustavo Biswas MD    Anesthesia Type: TIVA ASA Status: 3            Anesthesia Type: TIVA    Jerzy Phase I: Jerzy Score: 9    Jerzy Phase II:      Anesthesia Post Evaluation    Patient location during evaluation: PACU  Patient participation: complete - patient participated  Level of consciousness: awake and alert  Pain score: 0  Airway patency: patent  Nausea & Vomiting: no nausea  Cardiovascular status: hemodynamically stable  Respiratory status: acceptable, nonlabored ventilation and spontaneous ventilation  Hydration status: stable  Multimodal analgesia pain management approach  Pain management: adequate    No notable events documented.

## 2024-09-04 NOTE — ANESTHESIA PRE PROCEDURE
>35mS, T neg, V1-V2  When compared with ECG of 03-Aug-2023 13:17:00,  No significant change  Confirmed by: Jaye Lunsford   • Hx of echocardiogram 09/13/2023    Limited echo protocol utilized  •  Normal left ventricle cavity size.  •  The left ventricular systolic function is low normal (50-54%).   • Hypertension        Past Surgical History:        Procedure Laterality Date   • BOWEL RESECTION      6 yrs ago   • CARDIOVERSION  2023   • CHOLECYSTECTOMY     • HYSTERECTOMY (CERVIX STATUS UNKNOWN)  1992   • THORACENTESIS      x2 w/ local       Social History:    Social History     Tobacco Use   • Smoking status: Never   • Smokeless tobacco: Never   Substance Use Topics   • Alcohol use: Never                                Counseling given: Not Answered      Vital Signs (Current):   Vitals:    08/28/24 1009 09/04/24 0645   BP:  (!) 164/68   Pulse:  80   Resp:  18   Temp:  97.4 °F (36.3 °C)   TempSrc:  Oral   SpO2:  99%   Weight: 77.1 kg (170 lb) 77.1 kg (170 lb)   Height: 1.575 m (5' 2\")                                               BP Readings from Last 3 Encounters:   09/04/24 (!) 164/68       NPO Status: Time of last liquid consumption: 2200                        Time of last solid consumption: 2200                        Date of last liquid consumption: 09/03/24                        Date of last solid food consumption: 09/03/24    BMI:   Wt Readings from Last 3 Encounters:   09/04/24 77.1 kg (170 lb)     Body mass index is 31.09 kg/m².    CBC:   Lab Results   Component Value Date/Time    HGB 10.3 09/04/2024 07:25 AM       CMP: No results found for: \"NA\", \"K\", \"CL\", \"CO2\", \"BUN\", \"CREATININE\", \"GFRAA\", \"AGRATIO\", \"LABGLOM\", \"GLUCOSE\", \"GLU\", \"CALCIUM\", \"BILITOT\", \"ALKPHOS\", \"AST\", \"ALT\"    POC Tests:   Recent Labs     09/04/24 0726 09/04/24  0727   POCGLU 112*  --    POCK  --  4.4       Coags: No results found for: \"PROTIME\", \"INR\", \"APTT\"    HCG (If Applicable): No results found for: \"PREGTESTUR\",

## 2024-09-04 NOTE — PROGRESS NOTES
TRANSFER - IN REPORT:    Verbal report received from SHERICE Gao on Doris Sol  being received from PACU for routine progression of patient care      Report consisted of patient's Situation, Background, Assessment and   Recommendations(SBAR).     Information from the following report(s) Nurse Handoff Report was reviewed with the receiving nurse.    Opportunity for questions and clarification was provided.      Assessment to be completed upon patient's arrival to unit and then care will be assumed.

## 2024-09-04 NOTE — PROGRESS NOTES
Post Operative Plan:   1- WB status: Non-Weight Bearing   2-plan for suture removal in 3 weeks  3-patient will be admitted postoperatively for pain control and rehab needs.    4- DVT px: asa 81mg  5- Pain Medication: oxycodone and morphine  6- PT/OT ordered to evaluate and treat     Discharge planning: Skilled nursing facility/rehab

## 2024-09-04 NOTE — PROGRESS NOTES
TRANSFER - OUT REPORT:    Verbal report given to SHERICE Humphreys on Doris Sol  being transferred to [facility] for routine post-op       Report consisted of patient's Situation, Background, Assessment and   Recommendations(SBAR).     Information from the following report(s) Nurse Handoff Report, Index, Adult Overview, Surgery Report, MAR, and Event Log was reviewed with the receiving nurse.           Lines:   Peripheral IV 09/04/24 Right Wrist (Active)        Opportunity for questions and clarification was provided.      Patient transported with:  Tech

## 2024-09-04 NOTE — ANESTHESIA PROCEDURE NOTES
Peripheral Block    Patient location during procedure: pre-op  Reason for block: post-op pain management and at surgeon's request  Start time: 9/4/2024 8:04 AM  End time: 9/4/2024 8:07 AM  Staffing  Performed: anesthesiologist   Anesthesiologist: Gustavo Biswas MD  Performed by: Gustavo Biswas MD  Authorized by: Gustavo Biswas MD    Preanesthetic Checklist  Completed: patient identified, IV checked, site marked, risks and benefits discussed, surgical/procedural consents, equipment checked, pre-op evaluation, timeout performed, anesthesia consent given, oxygen available and monitors applied/VS acknowledged  Peripheral Block   Patient position: supine  Prep: ChloraPrep  Provider prep: mask  Patient monitoring: cardiac monitor, continuous pulse ox, frequent blood pressure checks, IV access and oxygen  Block type: Femoral  Adductor canal  Laterality: right  Injection technique: single-shot  Guidance: ultrasound guided  Local infiltration: ropivacaine  Local infiltration: ropivacaine    Needle   Needle type: insulated echogenic nerve stimulator needle   Needle gauge: 21 G  Needle localization: ultrasound guidance  Needle length: 10 cm  Assessment   Injection assessment: negative aspiration for heme, no paresthesia on injection, local visualized surrounding nerve on ultrasound and no intravascular symptoms  Paresthesia pain: none  Slow fractionated injection: yes  Hemodynamics: stable  Outcomes: patient tolerated procedure well and uncomplicated    Additional Notes  -Block placed for post op pain at surgeon's request.     -Ultrasound used to identify anatomy of nerve bundle.    -Needle placement and local injection at perineural area confirmed with real time ultrasound guidance.     -Local visualized with ultrasound surrounding nerve.    -Permanent Image taken and placed on chart.      Medications Administered  dexAMETHasone (DECADRON) (PF) 10 mg/mL injection - Other   2 mg - 9/4/2024 8:04:00 AM  mepivacaine 1.5% with

## 2024-09-04 NOTE — PROGRESS NOTES
ACUTE OCCUPATIONAL THERAPY GOALS:   (Developed with and agreed upon by patient and/or caregiver.)  1. Pt will toilet with SBA   2. Pt will complete functional mobility for ADLs with SBA using AD   3. Pt will complete lower body dressing with SBA using AE as needed  4. Pt will complete grooming and hygiene at sink with SBA  5. Pt will demonstrate independence with HEP to promote increased BUE strength and functional use for ADLs      Timeframe: 7 visits      OCCUPATIONAL THERAPY Initial Assessment and Daily Note       OT Visit Days: 1  Acknowledge Orders  Time  OT Charge Capture  Rehab Caseload Tracker   RLE NWB     Doris Judy Sol is a 73 y.o. female   PRIMARY DIAGNOSIS: Right foot infection  Right foot infection [L08.9]  Status post foot surgery [Z98.890]  Diabetic infection of right foot (HCC) [E11.628, L08.9]  Procedure(s) (LRB):  RIGHT TRANSMETATARSAL AMPUATION AND ACHILLES LENGTHENING (Right)  Day of Surgery  Reason for Referral: Other lack of cordination (R27.8)  Observation: Payor: [employer] MEDICARE / Plan: [employer] CHOICE-PPO MEDICARE / Product Type: *No Product type* /     ASSESSMENT:     REHAB RECOMMENDATIONS:   Recommendation to date pending progress:  Setting:  Short-term Rehab    Equipment:    To Be Determined     ASSESSMENT:  Ms. Sol was admitted with R foot infection, post op R transmetatarsal amputation, achilles tendon lengthening, and plantar wound debridement. RLE NWB. Pt presented with deficits in mobility, balance, and limitations of NWB status impacting ADLs. Pt required CGA-min A overall for ADLs and for functional transfers using rolling walker. Pt educated on WB precautions and on adaptive techniques to maintain. Pt presented below her independent baseline and would benefit from skilled OT services to address deficits. Recommend rehab at d/c.     [facility] AM-PAC™ “6 Clicks” Daily Activity Inpatient Short Form:    AM-PAC Daily Activity - Inpatient   How much help is needed

## 2024-09-05 LAB
ANION GAP SERPL CALC-SCNC: 9 MMOL/L (ref 9–18)
BASOPHILS # BLD: 0 K/UL (ref 0–0.2)
BASOPHILS NFR BLD: 0 % (ref 0–2)
BUN SERPL-MCNC: 27 MG/DL (ref 8–23)
CALCIUM SERPL-MCNC: 8.9 MG/DL (ref 8.8–10.2)
CHLORIDE SERPL-SCNC: 109 MMOL/L (ref 98–107)
CO2 SERPL-SCNC: 22 MMOL/L (ref 20–28)
CREAT SERPL-MCNC: 0.87 MG/DL (ref 0.6–1.1)
DIFFERENTIAL METHOD BLD: ABNORMAL
EOSINOPHIL # BLD: 0 K/UL (ref 0–0.8)
EOSINOPHIL NFR BLD: 0 % (ref 0.5–7.8)
ERYTHROCYTE [DISTWIDTH] IN BLOOD BY AUTOMATED COUNT: 14.3 % (ref 11.9–14.6)
GLUCOSE BLD STRIP.AUTO-MCNC: 181 MG/DL (ref 65–100)
GLUCOSE BLD STRIP.AUTO-MCNC: 193 MG/DL (ref 65–100)
GLUCOSE BLD STRIP.AUTO-MCNC: 202 MG/DL (ref 65–100)
GLUCOSE BLD STRIP.AUTO-MCNC: 246 MG/DL (ref 65–100)
GLUCOSE SERPL-MCNC: 230 MG/DL (ref 70–99)
HCT VFR BLD AUTO: 27.4 % (ref 35.8–46.3)
HGB BLD-MCNC: 8.6 G/DL (ref 11.7–15.4)
IMM GRANULOCYTES # BLD AUTO: 0 K/UL (ref 0–0.5)
IMM GRANULOCYTES NFR BLD AUTO: 0 % (ref 0–5)
LYMPHOCYTES # BLD: 1.9 K/UL (ref 0.5–4.6)
LYMPHOCYTES NFR BLD: 17 % (ref 13–44)
MCH RBC QN AUTO: 29.6 PG (ref 26.1–32.9)
MCHC RBC AUTO-ENTMCNC: 31.4 G/DL (ref 31.4–35)
MCV RBC AUTO: 94.2 FL (ref 82–102)
MONOCYTES # BLD: 0.5 K/UL (ref 0.1–1.3)
MONOCYTES NFR BLD: 5 % (ref 4–12)
NEUTS SEG # BLD: 8.8 K/UL (ref 1.7–8.2)
NEUTS SEG NFR BLD: 78 % (ref 43–78)
NRBC # BLD: 0 K/UL (ref 0–0.2)
PLATELET # BLD AUTO: 227 K/UL (ref 150–450)
PMV BLD AUTO: 11.5 FL (ref 9.4–12.3)
POTASSIUM SERPL-SCNC: 4.3 MMOL/L (ref 3.5–5.1)
RBC # BLD AUTO: 2.91 M/UL (ref 4.05–5.2)
SERVICE CMNT-IMP: ABNORMAL
SODIUM SERPL-SCNC: 140 MMOL/L (ref 136–145)
WBC # BLD AUTO: 11.4 K/UL (ref 4.3–11.1)

## 2024-09-05 PROCEDURE — 36415 COLL VENOUS BLD VENIPUNCTURE: CPT

## 2024-09-05 PROCEDURE — 80048 BASIC METABOLIC PNL TOTAL CA: CPT

## 2024-09-05 PROCEDURE — 97530 THERAPEUTIC ACTIVITIES: CPT

## 2024-09-05 PROCEDURE — G0378 HOSPITAL OBSERVATION PER HR: HCPCS

## 2024-09-05 PROCEDURE — 6370000000 HC RX 637 (ALT 250 FOR IP): Performed by: INTERNAL MEDICINE

## 2024-09-05 PROCEDURE — 6370000000 HC RX 637 (ALT 250 FOR IP): Performed by: ORTHOPAEDIC SURGERY

## 2024-09-05 PROCEDURE — 97162 PT EVAL MOD COMPLEX 30 MIN: CPT

## 2024-09-05 PROCEDURE — 85025 COMPLETE CBC W/AUTO DIFF WBC: CPT

## 2024-09-05 PROCEDURE — 6360000002 HC RX W HCPCS: Performed by: ORTHOPAEDIC SURGERY

## 2024-09-05 PROCEDURE — 82962 GLUCOSE BLOOD TEST: CPT

## 2024-09-05 PROCEDURE — 96374 THER/PROPH/DIAG INJ IV PUSH: CPT

## 2024-09-05 PROCEDURE — 2580000003 HC RX 258: Performed by: ORTHOPAEDIC SURGERY

## 2024-09-05 RX ADMIN — ACETAMINOPHEN 650 MG: 325 TABLET ORAL at 18:00

## 2024-09-05 RX ADMIN — POLYETHYLENE GLYCOL 3350 17 G: 17 POWDER, FOR SOLUTION ORAL at 09:06

## 2024-09-05 RX ADMIN — ACETAMINOPHEN 650 MG: 325 TABLET ORAL at 05:41

## 2024-09-05 RX ADMIN — SODIUM CHLORIDE, PRESERVATIVE FREE 10 ML: 5 INJECTION INTRAVENOUS at 20:31

## 2024-09-05 RX ADMIN — METHIMAZOLE 10 MG: 10 TABLET ORAL at 08:54

## 2024-09-05 RX ADMIN — SACUBITRIL AND VALSARTAN 0.5 TABLET: 24; 26 TABLET, FILM COATED ORAL at 08:54

## 2024-09-05 RX ADMIN — SACUBITRIL AND VALSARTAN 0.5 TABLET: 24; 26 TABLET, FILM COATED ORAL at 20:31

## 2024-09-05 RX ADMIN — APIXABAN 5 MG: 5 TABLET, FILM COATED ORAL at 08:54

## 2024-09-05 RX ADMIN — METOPROLOL SUCCINATE 200 MG: 50 TABLET, EXTENDED RELEASE ORAL at 08:55

## 2024-09-05 RX ADMIN — DOCUSATE SODIUM 100 MG: 100 CAPSULE, LIQUID FILLED ORAL at 08:54

## 2024-09-05 RX ADMIN — ACETAMINOPHEN 650 MG: 325 TABLET ORAL at 13:08

## 2024-09-05 RX ADMIN — SODIUM CHLORIDE, PRESERVATIVE FREE 10 ML: 5 INJECTION INTRAVENOUS at 09:06

## 2024-09-05 RX ADMIN — APIXABAN 5 MG: 5 TABLET, FILM COATED ORAL at 20:31

## 2024-09-05 RX ADMIN — CEFAZOLIN 2000 MG: 10 INJECTION, POWDER, FOR SOLUTION INTRAVENOUS at 00:36

## 2024-09-05 RX ADMIN — ACETAMINOPHEN 650 MG: 325 TABLET ORAL at 00:36

## 2024-09-05 RX ADMIN — INSULIN LISPRO 2 UNITS: 100 INJECTION, SOLUTION INTRAVENOUS; SUBCUTANEOUS at 17:59

## 2024-09-05 ASSESSMENT — PAIN SCALES - GENERAL: PAINLEVEL_OUTOF10: 0

## 2024-09-05 NOTE — PROGRESS NOTES
ACUTE PHYSICAL THERAPY GOALS:   (Developed with and agreed upon by patient and/or caregiver.)   Ms. Sol will perform supine to sit and sit to supine independently in 7 days.    Ms. Sol will perform sit to stand and bed to chair with rolling walker independently in 7 days.    Ms. Sol will perform gait NWB  ft with rolling walker independently in 7 days.     PHYSICAL THERAPY Initial Assessment, Daily Note, and AM  (Link to Caseload Tracking: PT Visit Days : 1  Acknowledge Orders  Time In/Out  PT Charge Capture  Rehab Caseload Tracker    Doris Sol is a 73 y.o. female   PRIMARY DIAGNOSIS: Right foot infection  Right foot infection [L08.9]  Status post foot surgery [Z98.890]  Diabetic infection of right foot (HCC) [E11.628, L08.9]  Procedure(s) (LRB):  RIGHT TRANSMETATARSAL AMPUATION AND ACHILLES LENGTHENING (Right)  1 Day Post-Op  Reason for Referral: Generalized Muscle Weakness (M62.81)  Difficulty in walking, Not elsewhere classified (R26.2)  Observation: Payor: [employer] MEDICARE / Plan: [employer] CHOICE-PPO MEDICARE / Product Type: *No Product type* /     ASSESSMENT:     REHAB RECOMMENDATIONS:   Recommendation to date pending progress:  Setting:  Short-term Rehab    Equipment:    To Be Determined     ASSESSMENT:  Ms. Sol is a very sweet lady who tells me how she has been dealing with a wound on her foot for a few years now.  She is hoping that now that she has had surgery she can recover and go on with her life.  Ms. Sol is s/p above.  She was a CNA at [employer].  Her daughter lives with her but is gone during the day.  Ms. Sol is to be NWB RLE.  Today she is received in the chair.  Sit to stand with rolling walker and verbal cues for hand placement.  First attempt at gait NWB she was not able to maintain.  She was \"dragging\" her right foot on the ground.  The only problem with that is she has neuropathy so she has no idea how much or little weight she is

## 2024-09-05 NOTE — CARE COORDINATION
ISAURA met with Ms. Sol in room 612 to discuss discharge planning.  She is observation status (MOON letter signed) POD #1 transmetatarsal amputation of her right foot due to an infection.    Prior to that surgery, she was living at home alone in her one story house (no steps).  Her  is .  She was fairly independent with ADLs, but was having difficulty driving due to her right foot infection and the physician-prescribed boot that she had to wear.  She said that [facility] had been coming out (although [facility] home health said that she is actually current with them).  She has 3 children:  Aiyana, Evi, and Mendez.      We discussed discharge planning.  In light of her amputation, she would like to go to short-term rehab at a skilled nursing facility.  She has already done some research on SNFs, and would like a referral sent to [facility] for STR.  CM sent referral via Epic (OT note only, PT evaluation is pending).       24 0824   Service Assessment   Patient Orientation Alert and Oriented   Cognition Alert   History Provided By Patient   Primary Caregiver Self   Support Systems Children   PCP Verified by CM Yes   Prior Functional Level Assistance with the following:;Other (see comment)  (needed assistance with driving due to right foot infection)   Current Functional Level Mobility  (POD #1 right TMA)   Can patient return to prior living arrangement No  (likely needs rehab (STR))   Ability to make needs known: Good   Family able to assist with home care needs: Yes   Would you like for me to discuss the discharge plan with any other family members/significant others, and if so, who? No   Condition of Participation: Discharge Planning   The Plan for Transition of Care is related to the following treatment goals: she would like to go to STR at discharge

## 2024-09-05 NOTE — PLAN OF CARE
Problem: Chronic Conditions and Co-morbidities  Goal: Patient's chronic conditions and co-morbidity symptoms are monitored and maintained or improved  9/5/2024 0737 by Chacha Silva RN  Outcome: Progressing  9/5/2024 0018 by Jamar Mccray RN  Outcome: Progressing     Problem: Safety - Adult  Goal: Free from fall injury  9/5/2024 0737 by Chacha Silva RN  Outcome: Progressing  9/5/2024 0018 by Jamar Mccray RN  Outcome: Progressing     Problem: Pain  Goal: Verbalizes/displays adequate comfort level or baseline comfort level  9/5/2024 0737 by Chacha Silva RN  Outcome: Progressing  9/5/2024 0018 by Jamar Mccray RN  Outcome: Progressing     Problem: Discharge Planning  Goal: Discharge to home or other facility with appropriate resources  9/5/2024 0737 by Chacha Silva RN  Outcome: Progressing  9/5/2024 0018 by Jamar Mccray RN  Outcome: Progressing

## 2024-09-05 NOTE — PROGRESS NOTES
Hospitalist Progress Note   Admit Date:  2024  6:31 AM   Name:  Doris Sol   Age:  73 y.o.  Sex:  female  :  1950   MRN:  [id number]   Room:      Presenting/Chief Complaint: No chief complaint on file.     Reason(s) for Admission: Right foot infection [L08.9]  Status post foot surgery [Z98.890]  Diabetic infection of right foot (HCC) [E11.628, L08.9]     Hospital Course:   Doris Sol is a 73 y.o. female with medical history of type 2 diabetes mellitus, hypertension, atrial fibrillation on Eliquis and diastolic heart failure.  Patient underwent surgical intervention with orthopedic surgery for right foot wound on .  And we were consulted for medical management.      Subjective & 24hr Events:   Patient resting comfortably on examination.  Patient had no overnight events or complaints on exam this morning.  Patient states that pain is adequately controlled at this time.      Assessment & Plan:     Right foot infection  Patient went for surgical intervention with orthopedic surgery today  Management per orthopedic surgery     Hypertension  Blood pressure ranging from 120-130 systolic   Continue home blood pressure medication  Continue to monitor and adjust as needed     Type 2 diabetes mellitus  Blood sugar adequately controlled.  Elevated blood sugars yesterday may have been from procedure  Correctional scale insulin  Continue to monitor and adjust as needed     Atrial fibrillation  Chronic diastolic heart failure  Appears euvolemic on exam  Heart rate adequately controlled  Lasix 40 mg twice daily  Metoprolol 200 mg daily  Continue Eliquis 5 mg twice daily  Strict I's/O and daily weights  Continue to monitor      Anticipated Discharge Arrangements:   Defer to primary team    PT/OT evals ordered?  Defer to primary team  Diet:  ADULT DIET; Regular  VTE prophylaxis: Already on anticoagulation  Code status: Full Code      Non-peripheral Lines and Tubes (if present):

## 2024-09-05 NOTE — PROGRESS NOTES
[facility]        2024         Post Op day: 1 Day Post-Op Procedure(s) (LRB):  RIGHT TRANSMETATARSAL AMPUATION AND ACHILLES LENGTHENING (Right)      Admit Date: 2024  Admit Diagnosis: Right foot infection [L08.9]  Status post foot surgery [Z98.890]  Diabetic infection of right foot (HCC) [E11.628, L08.9]       Principle Problem: Right foot infection.         Subjective: Doing well, No complaints, No SOB, No Chest Pain, No Nausea or Vomiting     Objective:   Vital Signs are Stable, No Acute Distress, Alert,  Dressing is Dry,  Neurovascular exam is normal.     Assessment / Plan :  Patient Active Problem List   Diagnosis    Charcot foot due to diabetes mellitus (HCC)    Right foot infection    Status post foot surgery    Diabetic infection of right foot (HCC)    A-fib (HCC)    Diabetes mellitus type 2 in nonobese (HCC)    HLD (hyperlipidemia)    HTN (hypertension)    Patient Vitals for the past 8 hrs:   BP Temp Pulse Resp SpO2   24 0854 (!) 131/47 -- 58 -- --   24 0718 (!) 131/47 97.7 °F (36.5 °C) 58 17 99 %    Temp (24hrs), Av.7 °F (36.5 °C), Min:97.3 °F (36.3 °C), Max:98.4 °F (36.9 °C)    Body mass index is 30.91 kg/m².    Lab Results   Component Value Date/Time    HGB 8.6 2024 05:41 AM          S/P Procedure(s) (LRB):  RIGHT TRANSMETATARSAL AMPUATION AND ACHILLES LENGTHENING (Right)      Vit D 2000iu daily x 12 weeks   Medical Mgmt per hospitalist  Anticoagulation plan: Eliquis 5 mg BID  Continue PT/OT, NWB RLE  Dressing change: do not change  Fall Precautions  DC disp: short term rehab   F/U: 2 weeks postop for wound check and staple removal        Signed By: THOMAS Shelley  2024,  12:02 PM

## 2024-09-05 NOTE — PROGRESS NOTES
Hourly rounds performed this shift. Bed rails up x2, bed set in lowest position, locked, and call bell within reach.  All needs met at this time.

## 2024-09-05 NOTE — PROGRESS NOTES
Pt up in chair most of shift. Pt had two BM. Dressing clean, dry, intact. BG covered once per MAR.    Rounds performed throughout shift. Pt denies needs at this time. Bed in low position, locked and call light/personal items within reach.

## 2024-09-06 VITALS
SYSTOLIC BLOOD PRESSURE: 121 MMHG | DIASTOLIC BLOOD PRESSURE: 59 MMHG | WEIGHT: 169 LBS | HEIGHT: 62 IN | TEMPERATURE: 97.7 F | RESPIRATION RATE: 16 BRPM | BODY MASS INDEX: 31.1 KG/M2 | OXYGEN SATURATION: 98 % | HEART RATE: 55 BPM

## 2024-09-06 LAB
GLUCOSE BLD STRIP.AUTO-MCNC: 116 MG/DL (ref 65–100)
GLUCOSE BLD STRIP.AUTO-MCNC: 181 MG/DL (ref 65–100)
SERVICE CMNT-IMP: ABNORMAL
SERVICE CMNT-IMP: ABNORMAL

## 2024-09-06 PROCEDURE — 97530 THERAPEUTIC ACTIVITIES: CPT

## 2024-09-06 PROCEDURE — 6370000000 HC RX 637 (ALT 250 FOR IP): Performed by: INTERNAL MEDICINE

## 2024-09-06 PROCEDURE — 82962 GLUCOSE BLOOD TEST: CPT

## 2024-09-06 PROCEDURE — 6370000000 HC RX 637 (ALT 250 FOR IP): Performed by: ORTHOPAEDIC SURGERY

## 2024-09-06 PROCEDURE — 2580000003 HC RX 258: Performed by: ORTHOPAEDIC SURGERY

## 2024-09-06 PROCEDURE — G0378 HOSPITAL OBSERVATION PER HR: HCPCS

## 2024-09-06 RX ORDER — APIXABAN 5 MG/1
5 TABLET, FILM COATED ORAL 2 TIMES DAILY
Qty: 60 TABLET | Refills: 0 | Status: SHIPPED | OUTPATIENT
Start: 2024-09-06

## 2024-09-06 RX ORDER — OXYCODONE HYDROCHLORIDE 5 MG/1
5 TABLET ORAL EVERY 6 HOURS PRN
Qty: 28 TABLET | Refills: 0 | Status: SHIPPED | OUTPATIENT
Start: 2024-09-06 | End: 2024-09-13

## 2024-09-06 RX ADMIN — SODIUM CHLORIDE, PRESERVATIVE FREE 10 ML: 5 INJECTION INTRAVENOUS at 09:14

## 2024-09-06 RX ADMIN — METHIMAZOLE 10 MG: 10 TABLET ORAL at 09:10

## 2024-09-06 RX ADMIN — SACUBITRIL AND VALSARTAN 0.5 TABLET: 24; 26 TABLET, FILM COATED ORAL at 09:09

## 2024-09-06 RX ADMIN — ACETAMINOPHEN 650 MG: 325 TABLET ORAL at 14:30

## 2024-09-06 RX ADMIN — ACETAMINOPHEN 650 MG: 325 TABLET ORAL at 01:52

## 2024-09-06 RX ADMIN — ACETAMINOPHEN 650 MG: 325 TABLET ORAL at 09:10

## 2024-09-06 RX ADMIN — APIXABAN 5 MG: 5 TABLET, FILM COATED ORAL at 09:10

## 2024-09-06 RX ADMIN — METOPROLOL SUCCINATE 200 MG: 50 TABLET, EXTENDED RELEASE ORAL at 09:09

## 2024-09-06 NOTE — CARE COORDINATION
Discharge note:  [facility] ambulance transport arranged for 4 pm to [facility] for STR, room 205W, report [phone].  This plan is ok with Ms. Sol in room 612 and with her RN.       09/06/24 0076   Service Assessment   Patient Orientation Alert and Oriented   Cognition Alert   Services At/After Discharge   Services At/After Discharge Skilled Nursing Facility (SNF)   Mode of Transport at Discharge [facility]   Condition of Participation: Discharge Planning   The Plan for Transition of Care is related to the following treatment goals: [facility] for STR   The Patient and/or Patient Representative was provided with a Choice of Provider? Patient   The Patient and/Or Patient Representative agree with the Discharge Plan? Yes   Freedom of Choice list was provided with basic dialogue that supports the patient's individualized plan of care/goals, treatment preferences, and shares the quality data associated with the providers?  Yes

## 2024-09-06 NOTE — PROGRESS NOTES
Hospitalist Progress Note   Admit Date:  2024  6:31 AM   Name:  Doris Sol   Age:  73 y.o.  Sex:  female  :  1950   MRN:  [id number]   Room:  [facility]    Presenting/Chief Complaint: No chief complaint on file.     Reason(s) for Admission: Right foot infection [L08.9]  Status post foot surgery [Z98.890]  Diabetic infection of right foot (HCC) [E11.628, L08.9]     Hospital Course:   Doris Sol is a 73 y.o. female with medical history of type 2 diabetes mellitus, hypertension, atrial fibrillation on Eliquis and diastolic heart failure.  Patient underwent surgical intervention with orthopedic surgery for right foot wound on .  And we were consulted for medical management.  Plan for discharge to rehab facility      Subjective & 24hr Events:   Patient resting comfortably on examination.  Patient states she is feeling well and denied any overnight events.      Assessment & Plan:     Right foot infection  Patient went for surgical intervention with orthopedic surgery today  Management per orthopedic surgery     Hypertension  Blood pressure ranging from 110-140 systolic  Continue home blood pressure medication  Continue to monitor and adjust as needed     Type 2 diabetes mellitus  Blood sugars adequately controlled  Would continue metformin with plan for discharge     Atrial fibrillation  Chronic diastolic heart failure  Appears euvolemic on exam  Heart rate adequately controlled  Lasix 40 mg twice daily  Metoprolol 200 mg daily  Continue Eliquis 5 mg twice daily  Would continue all prior home medications of patient to discharge      Anticipated Discharge Arrangements:   Defer to primary team    PT/OT evals ordered?  Defer to primary team  Diet:  ADULT DIET; Regular  VTE prophylaxis: Already on anticoagulation  Code status: Full Code      Non-peripheral Lines and Tubes (if present):          Telemetry (if present):           Hospital Problems:  Principal Problem:    Right foot

## 2024-09-06 NOTE — CARE COORDINATION
Dispo update:  Ms. Sol agrees to go to STR at [facility].  CM attempeted to start [employer] pre-cert on [employer] web portal, but got message \"This date is outside the range that [facility] manages this patient. Please select a different date, or contact [facility] to confirm patient management status.\"  CM contacted Ms. Roseanne Bess of [facility], and they will start the [employer] pre-cert.

## 2024-09-06 NOTE — PROGRESS NOTES
Hourly rounds performed this shift. Bed lowered and locked. Call light within reach. Dressing clean, dry and intact.

## 2024-09-06 NOTE — PROGRESS NOTES
Report given to Susan at [facility], opportunity to ask questions was given,  call back number given if any questions were to arise.  [facility] here to transfer pt at this time.

## 2024-09-06 NOTE — PROGRESS NOTES
ACUTE OCCUPATIONAL THERAPY GOALS:   (Developed with and agreed upon by patient and/or caregiver.)  1. Pt will toilet with SBA   2. Pt will complete functional mobility for ADLs with SBA using AD   3. Pt will complete lower body dressing with SBA using AE as needed  4. Pt will complete grooming and hygiene at sink with SBA  5. Pt will demonstrate independence with HEP to promote increased BUE strength and functional use for ADLs        Timeframe: 7 visits    OCCUPATIONAL THERAPY: Daily Note AM   OT Visit Days: 2   Time In/Out  OT Charge Capture  Rehab Caseload Tracker  OT Orders  NWB RLE    Doris Judy Sol is a 73 y.o. female   PRIMARY DIAGNOSIS: Right foot infection  Right foot infection [L08.9]  Status post foot surgery [Z98.890]  Diabetic infection of right foot (HCC) [E11.628, L08.9]  Procedure(s) (LRB):  RIGHT TRANSMETATARSAL AMPUATION AND ACHILLES LENGTHENING (Right)  2 Days Post-Op  Observation: Payor: [employer] MEDICARE / Plan: [employer] CHOICE-O MEDICARE / Product Type: *No Product type* /     ASSESSMENT:     REHAB RECOMMENDATIONS:   Recommendation to date pending progress:  Setting:  Short-term Rehab    Equipment:    To Be Determined     ASSESSMENT:  Ms. Sol continues to be limited by NWB status in RLE as well as deficits in mobility and balance. Prior to OT entry, she completed all ADLs therefore this session focused on functional mobility and balance. She performs multiple sit to stand from chair using rolling walker and with cueing for NWB RLE. OT educated pt to extend RLE/ kick out front to prevent her from weightbearing. She also participated in dynamic standing balance tasks to promote strength and mobility. Pt also attempted hopping forward with therapist assist and gait belt, however unable to achieve a full hop while maintaining NWB. Pt expresses concern and frustration regarding decreased independence and mobility. OT utilized therapeutic use of self to provide support. Fair progress,

## 2024-09-06 NOTE — DISCHARGE SUMMARY
[facility]   Discharge Summary         Patient ID:  Doris Sol  [id number]  73 y.o.  1950    Admit date: 9/4/2024  Discharge date and time:    Admitting Physician: Matthieu Lerma MD  Surgeon: Same    Hospital Course    Admission Diagnoses: Pre op diagnosis: Right foot infection [L08.9]  Prior to surgery the patient was seen for consultation in the office or hospital and a complete history and physical was taken as it pertained to their condition.   Discharge Diagnoses: Right foot infection. Chronic and Acute medical problems addressed during this hospital stay by consulting physicians include:   They underwent Procedure(s) (LRB):  RIGHT TRANSMETATARSAL AMPUATION AND ACHILLES LENGTHENING (Right) for this.       Principle Problem: Right foot infection.     Other Chronic and Acute Medical Issues: managed by the hospitalist during admission included: Principal Problem:    Right foot infection  Active Problems:    Status post foot surgery    Diabetic infection of right foot (HCC)    A-fib (HCC)    Diabetes mellitus type 2 in nonobese (HCC)    HLD (hyperlipidemia)    HTN (hypertension)  Resolved Problems:    * No resolved hospital problems. *                               Perioperative Antibiotics:  Prior to surgery Ancef 1 to 2 mg was given depending on patient's weight and allergies.  If the patient was allergic to Ancef or MRSA positive  the patient was given [facility]comycin and [facility]        [facility] Medications:   Current Facility-Administered Medications   Medication Dose Route Frequency    docusate sodium (COLACE) capsule 100 mg  100 mg Oral BID    apixaban (ELIQUIS) tablet 5 mg  5 mg Oral BID    furosemide (LASIX) tablet 40 mg  40 mg Oral QAM    methIMAzole (TAPAZOLE) tablet 10 mg  10 mg Oral Daily    metoprolol succinate (TOPROL XL) extended release tablet 200 mg  200 mg Oral Daily    sodium chloride flush 0.9 % injection 5-40 mL  5-40 mL IntraVENous 2 times per day    sodium chloride  flush 0.9 % injection 5-40 mL  5-40 mL IntraVENous PRN    0.9 % sodium chloride infusion   IntraVENous PRN    potassium chloride 20 mEq/50 mL IVPB (Central Line)  20 mEq IntraVENous PRN    Or    potassium chloride 10 mEq/100 mL IVPB (Peripheral Line)  10 mEq IntraVENous PRN    magnesium sulfate 2000 mg in 50 mL IVPB premix  2,000 mg IntraVENous PRN    oxyCODONE (ROXICODONE) immediate release tablet 5 mg  5 mg Oral Q4H PRN    Or    oxyCODONE (ROXICODONE) immediate release tablet 10 mg  10 mg Oral Q4H PRN    morphine (PF) injection 2 mg  2 mg IntraVENous Q2H PRN    Or    morphine sulfate (PF) injection 4 mg  4 mg IntraVENous Q2H PRN    ondansetron (ZOFRAN-ODT) disintegrating tablet 4 mg  4 mg Oral Q8H PRN    Or    ondansetron (ZOFRAN) injection 4 mg  4 mg IntraVENous Q6H PRN    polyethylene glycol (GLYCOLAX) packet 17 g  17 g Oral Daily    acetaminophen (TYLENOL) tablet 650 mg  650 mg Oral Q6H    glucose chewable tablet 16 g  4 tablet Oral PRN    dextrose bolus 10% 125 mL  125 mL IntraVENous PRN    Or    dextrose bolus 10% 250 mL  250 mL IntraVENous PRN    Glucagon Emergency KIT 1 mg  1 mg SubCUTAneous PRN    dextrose 10 % infusion   IntraVENous Continuous PRN    insulin lispro (HUMALOG,ADMELOG) injection vial 0-4 Units  0-4 Units SubCUTAneous TID WC    insulin lispro (HUMALOG,ADMELOG) injection vial 0-4 Units  0-4 Units SubCUTAneous Nightly    sacubitril-valsartan (ENTRESTO) 24-26 MG per tablet 0.5 tablet  0.5 tablet Oral BID         Additional DVT Prophylaxis:  NICOLÁS Hose, SCDs     Postoperative Blood Report: If the patient received blood products during their admission they are listed below:     No components found for: \"PCTEXX\"  No components found for: \"PCTABR\"  No results found for: \"ABORH\"  No components found for: \"PCTUN\"  No components found for: \"PCTCT\"  No components found for: \"PCTUDIV\"  No components found for: \"PCTXM\"    Post Op complications: none       Physical Therapy: PT was started on the day of

## 2024-09-06 NOTE — PROGRESS NOTES
[facility]        2024         Post Op day: 2 Days Post-Op Procedure(s) (LRB):  RIGHT TRANSMETATARSAL AMPUATION AND ACHILLES LENGTHENING (Right)      Admit Date: 2024  Admit Diagnosis: Right foot infection [L08.9]  Status post foot surgery [Z98.890]  Diabetic infection of right foot (HCC) [E11.628, L08.9]       Principle Problem: Right foot infection.         Subjective: Doing well, No complaints, No SOB, No Chest Pain, No Nausea or Vomiting     Objective:   Vital Signs are Stable, No Acute Distress, Alert, Splint c/d/i,  Neurovascular exam is normal.     Assessment / Plan :  Patient Active Problem List   Diagnosis    Charcot foot due to diabetes mellitus (HCC)    Right foot infection    Status post foot surgery    Diabetic infection of right foot (HCC)    A-fib (HCC)    Diabetes mellitus type 2 in nonobese (HCC)    HLD (hyperlipidemia)    HTN (hypertension)    Patient Vitals for the past 8 hrs:   BP Temp Temp src Pulse Resp SpO2   24 0718 (!) 117/57 98.2 °F (36.8 °C) Oral 56 18 97 %   24 0300 (!) 121/50 97.9 °F (36.6 °C) Oral 58 16 95 %    Temp (24hrs), Av.8 °F (36.6 °C), Min:97.3 °F (36.3 °C), Max:98.2 °F (36.8 °C)    Body mass index is 30.91 kg/m².    Lab Results   Component Value Date/Time    HGB 8.6 2024 05:41 AM          S/P Procedure(s) (LRB):  RIGHT TRANSMETATARSAL AMPUATION AND ACHILLES LENGTHENING (Right)      Vit D 2000iu daily x 12 weeks   Medical Mgmt per hospitalist  Anticoagulation plan: Eliquis 5 mg BID  Continue PT/OT, NWB RLE  Dressing change: do not change  Fall Precautions  DC disp: short term rehab-scripts on chart    F/U: 2 weeks postop for wound check and staple removal        Signed By: THOMAS Shelley  2024,  7:56 AM

## 2024-09-19 ENCOUNTER — OFFICE VISIT (OUTPATIENT)
Dept: ORTHOPEDIC SURGERY | Age: 74
End: 2024-09-19

## 2024-09-19 DIAGNOSIS — E11.610 CHARCOT FOOT DUE TO DIABETES MELLITUS (HCC): ICD-10-CM

## 2024-09-19 DIAGNOSIS — G89.18 POST-OP PAIN: Primary | ICD-10-CM

## 2024-09-19 PROCEDURE — M5009 MISC POST OP SHOE: HCPCS | Performed by: PHYSICIAN ASSISTANT

## 2024-09-19 PROCEDURE — 99024 POSTOP FOLLOW-UP VISIT: CPT | Performed by: PHYSICIAN ASSISTANT

## 2024-10-09 ENCOUNTER — TELEPHONE (OUTPATIENT)
Dept: ORTHOPEDIC SURGERY | Age: 74
End: 2024-10-09

## 2024-10-09 NOTE — TELEPHONE ENCOUNTER
Please call patient , she would like to [facility] appt to [facility], better for her transportation to bring her  10/23

## 2024-12-10 ENCOUNTER — OFFICE VISIT (OUTPATIENT)
Dept: ORTHOPEDIC SURGERY | Age: 74
End: 2024-12-10
Payer: MEDICARE

## 2024-12-10 DIAGNOSIS — E11.610 CHARCOT FOOT DUE TO DIABETES MELLITUS (HCC): Primary | ICD-10-CM

## 2024-12-10 DIAGNOSIS — G89.18 POST-OP PAIN: ICD-10-CM

## 2024-12-10 PROCEDURE — 1036F TOBACCO NON-USER: CPT | Performed by: PHYSICIAN ASSISTANT

## 2024-12-10 PROCEDURE — G8484 FLU IMMUNIZE NO ADMIN: HCPCS | Performed by: PHYSICIAN ASSISTANT

## 2024-12-10 PROCEDURE — 1090F PRES/ABSN URINE INCON ASSESS: CPT | Performed by: PHYSICIAN ASSISTANT

## 2024-12-10 PROCEDURE — 3017F COLORECTAL CA SCREEN DOC REV: CPT | Performed by: PHYSICIAN ASSISTANT

## 2024-12-10 PROCEDURE — G8417 CALC BMI ABV UP PARAM F/U: HCPCS | Performed by: PHYSICIAN ASSISTANT

## 2024-12-10 PROCEDURE — G8400 PT W/DXA NO RESULTS DOC: HCPCS | Performed by: PHYSICIAN ASSISTANT

## 2024-12-10 PROCEDURE — 1123F ACP DISCUSS/DSCN MKR DOCD: CPT | Performed by: PHYSICIAN ASSISTANT

## 2024-12-10 PROCEDURE — 99213 OFFICE O/P EST LOW 20 MIN: CPT | Performed by: PHYSICIAN ASSISTANT

## 2024-12-10 PROCEDURE — G8428 CUR MEDS NOT DOCUMENT: HCPCS | Performed by: PHYSICIAN ASSISTANT

## 2024-12-10 PROCEDURE — 3046F HEMOGLOBIN A1C LEVEL >9.0%: CPT | Performed by: PHYSICIAN ASSISTANT

## 2024-12-10 PROCEDURE — 2022F DILAT RTA XM EVC RTNOPTHY: CPT | Performed by: PHYSICIAN ASSISTANT

## 2024-12-10 NOTE — PROGRESS NOTES
Name: Doris Sol  YOB: 1950  Gender: female  MRN: [id number]    Plan:    Wound is healed.  Full-length custom inserts with shoe filler and extra-depth shoes ordered today      Follow up in 2-month(s)without XR         Procedure: Right Transmetatarsal Ampuation And Achilles Lengthening - Right    Surgery Date: 9/4/2024      Subjective: Denies fevers chills or infection.  Denies any signs of spreading erythema.  Doing well.  Denies any significant pain.    12/10/2024-patient states that she is doing well.  She has no pain in her foot.  Her only complaint at this time is her balance.  She states that she feels very unstable.  She has been ambulating in the postop shoe.    ROS: Patient Denies fever/chills, headache, visual changes, chest pain, shortness of breath, and nausea/vomiting/diarrhea     Exam:     Wounds: appears to be healing well with good reapproximation, healed      Vascular: BLE: 2+ DP pulse, toes wwp w/ BCR<2s    Imaging:   No imaging reviewed

## 2025-03-24 ENCOUNTER — TELEPHONE (OUTPATIENT)
Dept: ORTHOPEDIC SURGERY | Age: 75
End: 2025-03-24

## 2025-03-24 NOTE — TELEPHONE ENCOUNTER
She is calling to speak to you about the appt to have her prosthesis checked. She doesn't even have it yet because she is still going through the process of getting it with [facility]. She is thinking she doesn't need to be seen tomorrow

## (undated) DEVICE — STERILE PVP: Brand: MEDLINE INDUSTRIES, INC.

## (undated) DEVICE — GOWN,SIRUS,NONRNF,SETINSLV,XL,20/CS: Brand: MEDLINE

## (undated) DEVICE — GLOVE SURG SZ 8 L12IN FNGR THK79MIL GRN LTX FREE

## (undated) DEVICE — NEPTUNE E-SEP SMOKE EVACUATION PENCIL, COATED, 70MM BLADE, PUSH BUTTON SWITCH: Brand: NEPTUNE E-SEP

## (undated) DEVICE — SUTURE PROL SZ 3-0 L18IN NONABSORBABLE BLU L19MM PS-2 3/8 8687H

## (undated) DEVICE — PAD,ABDOMINAL,5"X9",ST,LF,25/BX: Brand: MEDLINE INDUSTRIES, INC.

## (undated) DEVICE — SPONGE LAP W18XL18IN WHT COT 4 PLY FLD STRUNG RADPQ DISP ST 2 PER PACK

## (undated) DEVICE — SUTURE VICRYL SZ 2-0 L27IN ABSRB UD L26MM CT-2 1/2 CIR J269H

## (undated) DEVICE — SUTURE MONOCRYL SZ 3-0 L27IN ABSRB UD L19MM PS-2 3/8 CIR PRIM Y427H

## (undated) DEVICE — GLOVE ORTHO 8   MSG9480

## (undated) DEVICE — BANDAGE,ELASTIC,ESMARK,STERILE,4"X9',LF: Brand: MEDLINE

## (undated) DEVICE — SOLUTION IRRIG 1000ML 0.9% SOD CHL USP POUR PLAS BTL

## (undated) DEVICE — CLOTH PRE OP W9XL10.5IN 2% CHG FRAGRANCE RNS FREE READYPREP

## (undated) DEVICE — GLOVE ORANGE PI 7 1/2   MSG9075

## (undated) DEVICE — GLOVE SURG SZ 85 L12IN FNGR ORTHO 126MIL CRM LTX FREE

## (undated) DEVICE — PRECISION THIN (9.0 X 0.38 X 31.0MM)

## (undated) DEVICE — FOOT DR TOLLISON & WOMACK: Brand: MEDLINE INDUSTRIES, INC.